# Patient Record
Sex: FEMALE | Race: WHITE | NOT HISPANIC OR LATINO | Employment: UNEMPLOYED | URBAN - METROPOLITAN AREA
[De-identification: names, ages, dates, MRNs, and addresses within clinical notes are randomized per-mention and may not be internally consistent; named-entity substitution may affect disease eponyms.]

---

## 2018-05-02 ENCOUNTER — OFFICE VISIT (OUTPATIENT)
Dept: FAMILY MEDICINE | Facility: CLINIC | Age: 23
End: 2018-05-02
Payer: COMMERCIAL

## 2018-05-02 VITALS
WEIGHT: 168.7 LBS | BODY MASS INDEX: 29.89 KG/M2 | HEIGHT: 63 IN | TEMPERATURE: 98.5 F | DIASTOLIC BLOOD PRESSURE: 75 MMHG | SYSTOLIC BLOOD PRESSURE: 116 MMHG | HEART RATE: 84 BPM

## 2018-05-02 DIAGNOSIS — Z00.00 ROUTINE GENERAL MEDICAL EXAMINATION AT A HEALTH CARE FACILITY: Primary | ICD-10-CM

## 2018-05-02 LAB
CHOLEST SERPL-MCNC: 171 MG/DL
HDLC SERPL-MCNC: 66 MG/DL
LDLC SERPL CALC-MCNC: 94 MG/DL
NONHDLC SERPL-MCNC: 105 MG/DL
TRIGL SERPL-MCNC: 56 MG/DL

## 2018-05-02 PROCEDURE — 80061 LIPID PANEL: CPT | Performed by: FAMILY MEDICINE

## 2018-05-02 PROCEDURE — 36415 COLL VENOUS BLD VENIPUNCTURE: CPT | Performed by: FAMILY MEDICINE

## 2018-05-02 PROCEDURE — 99385 PREV VISIT NEW AGE 18-39: CPT | Performed by: FAMILY MEDICINE

## 2018-05-02 NOTE — MR AVS SNAPSHOT
After Visit Summary   5/2/2018    María Elena Moscoso    MRN: [id number]           Patient Information     Date Of Birth          1995        Visit Information        Provider Department      5/2/2018 8:00 AM Radha Jefferson MD [facility]        Today's Diagnoses     Routine general medical examination at a health care facility    -  1      Care Instructions      Preventive Health Recommendations  Female Ages 18 to 25     Yearly exam:     See your health care provider every year in order to  o Review health changes.   o Discuss preventive care.    o Review your medicines if your doctor has prescribed any.      You should be tested each year for STDs (sexually transmitted diseases).       After age 20, talk to your provider about how often you should have cholesterol testing.      Starting at age 21, get a Pap test every three years. If you have an abnormal result, your doctor may have you test more often.      If you are at risk for diabetes, you should have a diabetes test (fasting glucose).     Shots:     Get a flu shot each year.     Get a tetanus shot every 10 years.     Consider getting the shot (vaccine) that prevents cervical cancer (Gardasil).    Nutrition:     Eat at least 5 servings of fruits and vegetables each day.    Eat whole-grain bread, whole-wheat pasta and brown rice instead of white grains and rice.    Talk to your provider about Calcium and Vitamin D.     Lifestyle    Exercise at least 150 minutes a week each week (30 minutes a day, 5 days a week). This will help you control your weight and prevent disease.    Limit alcohol to one drink per day.    No smoking.     Wear sunscreen to prevent skin cancer.    See your dentist every six months for an exam and cleaning.        Next year we need to make sure that you get a consultation to get the right breast cancer screening for you           Follow-ups after your visit        Who to contact     Normal or non-critical lab  "and imaging results will be communicated to you by MyChart, letter or phone within 4 business days after the clinic has received the results. If you do not hear from us within 7 days, please contact the clinic through Listiahart or phone. If you have a critical or abnormal lab result, we will notify you by phone as soon as possible.  Submit refill requests through iFlipd or call your pharmacy and they will forward the refill request to us. Please allow 3 business days for your refill to be completed.          If you need to speak with a  for additional information , please call: [phone]             Additional Information About Your Visit        iFlipd Information     iFlipd lets you send messages to your doctor, view your test results, renew your prescriptions, schedule appointments and more. To sign up, go to [email or web address] . Click on \"Log in\" on the left side of the screen, which will take you to the Welcome page. Then click on \"Sign up Now\" on the right side of the page.     You will be asked to enter the access code listed below, as well as some personal information. Please follow the directions to create your username and password.     Your access code is: [email or web address]  Expires: 2018  8:51 AM     Your access code will  in 90 days. If you need help or a new code, please call your [facility] clinic or [phone].        Care EveryWhere ID     This is your Care EveryWhere ID. This could be used by other organizations to access your [facility] medical records  [id number]        Your Vitals Were     Pulse Temperature Height BMI (Body Mass Index)          84 98.5  F (36.9  C) (Tympanic) 5' 2.5\" (1.588 m) 30.36 kg/m2         Blood Pressure from Last 3 Encounters:   18 116/75    Weight from Last 3 Encounters:   18 168 lb 11.2 oz (76.5 kg)              We Performed the Following     Lipid panel reflex to direct LDL Fasting        Primary Care Provider Fax #    " Physician No Ref-Primary [phone]       No address on file        Equal Access to Services     FABRICIOBROOKLYN EDMUND : Hadii aad ku hadvaleriaherlinda Munoz, wajuan joseda cyn, marnieporter jamesonkadenfaheem parrbeatricefaheem, keely benjaminteojavy conn. So [facility] [phone].    ATENCIÓN: Si habla español, tiene a rodriguez disposición servicios gratuitos de asistencia lingüística. Llame al [phone].    We comply with applicable federal civil rights laws and [place] laws. We do not discriminate on the basis of race, color, national origin, age, disability, sex, sexual orientation, or gender identity.            Thank you!     Thank you for choosing [facility]  for your care. Our goal is always to provide you with excellent care. Hearing back from our patients is one way we can continue to improve our services. Please take a few minutes to complete the written survey that you may receive in the mail after your visit with us. Thank you!             Your Updated Medication List - Protect others around you: Learn how to safely use, store and throw away your medicines at www.disposemymeds.org.          This list is accurate as of 5/2/18  8:51 AM.  Always use your most recent med list.                   Brand Name Dispense Instructions for use Diagnosis    norgestrel-ethinyl estradiol 0.3-30 MG-MCG per tablet    LO/OVRAL     Take 1 tablet by mouth        ranitidine 150 MG tablet    ZANTAC     Take 150 mg by mouth

## 2018-05-02 NOTE — PATIENT INSTRUCTIONS
Preventive Health Recommendations  Female Ages 18 to 25     Yearly exam:     See your health care provider every year in order to  o Review health changes.   o Discuss preventive care.    o Review your medicines if your doctor has prescribed any.      You should be tested each year for STDs (sexually transmitted diseases).       After age 20, talk to your provider about how often you should have cholesterol testing.      Starting at age 21, get a Pap test every three years. If you have an abnormal result, your doctor may have you test more often.      If you are at risk for diabetes, you should have a diabetes test (fasting glucose).     Shots:     Get a flu shot each year.     Get a tetanus shot every 10 years.     Consider getting the shot (vaccine) that prevents cervical cancer (Gardasil).    Nutrition:     Eat at least 5 servings of fruits and vegetables each day.    Eat whole-grain bread, whole-wheat pasta and brown rice instead of white grains and rice.    Talk to your provider about Calcium and Vitamin D.     Lifestyle    Exercise at least 150 minutes a week each week (30 minutes a day, 5 days a week). This will help you control your weight and prevent disease.    Limit alcohol to one drink per day.    No smoking.     Wear sunscreen to prevent skin cancer.    See your dentist every six months for an exam and cleaning.        Next year we need to make sure that you get a consultation to get the right breast cancer screening for you

## 2018-05-02 NOTE — PROGRESS NOTES
SUBJECTIVE:   CC: María Elena Moscoso is an 22 year old woman who presents for preventive health visit.     Healthy Habits:    Do you get at least three servings of calcium containing foods daily (dairy, green leafy vegetables, etc.)? yes    Amount of exercise or daily activities, outside of work: 4-5 day(s) per week    Problems taking medications regularly No    Medication side effects: No    Have you had an eye exam in the past two years? yes    Do you see a dentist twice per year? yes    Do you have sleep apnea, excessive snoring or daytime drowsiness?no      She is pretty healthy she is getting  in 17 days   Mom with breast cancer age 35   She is really healthy             Today's PHQ-2 Score: No flowsheet data found.    Abuse: Current or Past(Physical, Sexual or Emotional)- No  Do you feel safe in your environment - Yes    Social History   Substance Use Topics     Smoking status: Never Smoker     Smokeless tobacco: Never Used     Alcohol use Yes      Comment: Social     If you drink alcohol do you typically have >3 drinks per day or >7 drinks per week? No                     Reviewed orders with patient.  Reviewed health maintenance and updated orders accordingly - Yes  There is no problem list on file for this patient.    Past Surgical History:   Procedure Laterality Date     NO HISTORY OF SURGERY         Social History   Substance Use Topics     Smoking status: Never Smoker     Smokeless tobacco: Never Used     Alcohol use Yes      Comment: Social     Family History   Problem Relation Age of Onset     Breast Cancer Mother      Dementia Maternal Grandfather      Other Cancer Paternal Grandfather            Will start getting mammo next year willl need individualized plan     Pertinent mammograms are reviewed under the imaging tab.    *Patient reported last pap August 2017 at [facility].  History of abnormal Pap smear: NO - age 21-29 PAP every 3 years recommended    Reviewed and updated as  "needed this visit by clinical staff  Tobacco  Allergies  Meds  Med Hx  Surg Hx  Fam Hx  Soc Hx        Reviewed and updated as needed this visit by Provider            ROS:  CONSTITUTIONAL: NEGATIVE for fever, chills, change in weight  INTEGUMENTARU/SKIN: NEGATIVE for worrisome rashes, moles or lesions   EYES: NEGATIVE for vision changes or irritation  ENT: NEGATIVE for ear, mouth and throat problems  RESP: NEGATIVE for significant cough or SOB  BREAST: NEGATIVE for masses, tenderness or discharge  CV: NEGATIVE for chest pain, palpitations or peripheral edema  GI: NEGATIVE for nausea, abdominal pain, heartburn, or change in bowel habits  : NEGATIVE for unusual urinary or vaginal symptoms. Periods are regular.  MUSCULOSKELETAL: NEGATIVE for significant arthralgias or myalgia  NEURO: NEGATIVE for weakness, dizziness or paresthesias  PSYCHIATRIC: NEGATIVE for changes in mood or affect    OBJECTIVE:   /75  Pulse 84  Temp 98.5  F (36.9  C) (Tympanic)  Ht 5' 2.5\" (1.588 m)  Wt 168 lb 11.2 oz (76.5 kg)  BMI 30.36 kg/m2  EXAM:  GENERAL: healthy, alert and no distress  HENT: ear canals and TM's normal, nose and mouth without ulcers or lesions  NECK: no adenopathy, no asymmetry, masses, or scars and thyroid normal to palpation  RESP: lungs clear to auscultation - no rales, rhonchi or wheezes  BREAST: normal without masses, tenderness or nipple discharge and no palpable axillary masses or adenopathy  CV: regular rate and rhythm, normal S1 S2, no S3 or S4, no murmur, click or rub, no peripheral edema and peripheral pulses strong  ABDOMEN: soft, nontender, no hepatosplenomegaly, no masses and bowel sounds normal  MS: no gross musculoskeletal defects noted, no edema  PSYCH: mentation appears normal, affect normal/bright    ASSESSMENT/PLAN:   1. Routine general medical examination at a health care facility    - Lipid panel reflex to direct LDL Fasting    COUNSELING:   Reviewed preventive health counseling, as " "reflected in patient instructions       will need to be referred for individualized breast cancer screening next year,.         reports that she has never smoked. She has never used smokeless tobacco.    Estimated body mass index is 30.36 kg/(m^2) as calculated from the following:    Height as of this encounter: 5' 2.5\" (1.588 m).    Weight as of this encounter: 168 lb 11.2 oz (76.5 kg).   Weight management plan: Discussed healthy diet and exercise guidelines and patient will follow up in 12 months in clinic to re-evaluate.    Counseling Resources:  ATP IV Guidelines  Pooled Cohorts Equation Calculator  Breast Cancer Risk Calculator  FRAX Risk Assessment  ICSI Preventive Guidelines  Dietary Guidelines for Americans, 2010  USDA's MyPlate  ASA Prophylaxis  Lung CA Screening    Radha Jefferson MD  [facility]  "

## 2019-03-25 ENCOUNTER — HOSPITAL ENCOUNTER (OUTPATIENT)
Dept: OBGYN | Facility: HOSPITAL | Age: 24
Discharge: HOME | End: 2019-03-25
Attending: OBSTETRICS & GYNECOLOGY | Admitting: OBSTETRICS & GYNECOLOGY

## 2019-03-25 LAB
ABO/RH(D): NORMAL
ALT SERPL W P-5'-P-CCNC: 9 U/L (ref 0–45)
ANTIBODY SCREEN: NEGATIVE
APTT PPP: 29 SECONDS (ref 24–37)
AST SERPL W P-5'-P-CCNC: 16 U/L (ref 0–40)
CREAT SERPL-MCNC: 0.66 MG/DL (ref 0.6–1.1)
CREAT UR-MCNC: 29.9 MG/DL
ERYTHROCYTE [DISTWIDTH] IN BLOOD BY AUTOMATED COUNT: 14.5 % (ref 11–14.5)
GFR SERPL CREATININE-BSD FRML MDRD: >60 ML/MIN/1.73M2
HCT VFR BLD AUTO: 36.1 % (ref 35–47)
HGB BLD-MCNC: 12.2 G/DL (ref 12–16)
INR PPP: 0.98 (ref 0.9–1.1)
MCH RBC QN AUTO: 28.6 PG (ref 27–34)
MCHC RBC AUTO-ENTMCNC: 33.8 G/DL (ref 32–36)
MCV RBC AUTO: 85 FL (ref 80–100)
PLATELET # BLD AUTO: 214 THOU/UL (ref 140–440)
PMV BLD AUTO: 11.3 FL (ref 8.5–12.5)
PROTEIN, RANDOM URINE - HISTORICAL: <7 MG/DL
PROTEIN/CREAT RATIO, RANDOM UR: NORMAL
RBC # BLD AUTO: 4.27 MILL/UL (ref 3.8–5.4)
URATE SERPL-MCNC: 5.6 MG/DL (ref 2–7.5)
WBC: 12.1 THOU/UL (ref 4–11)

## 2019-03-25 ASSESSMENT — MIFFLIN-ST. JEOR: SCORE: 1537.61

## 2019-04-07 ASSESSMENT — MIFFLIN-ST. JEOR: SCORE: 1537.61

## 2019-04-08 ENCOUNTER — SURGERY - HEALTHEAST (OUTPATIENT)
Dept: OBGYN | Facility: HOSPITAL | Age: 24
End: 2019-04-08

## 2019-04-08 ENCOUNTER — ANESTHESIA - HEALTHEAST (OUTPATIENT)
Dept: OBGYN | Facility: HOSPITAL | Age: 24
End: 2019-04-08

## 2019-04-12 ENCOUNTER — COMMUNICATION - HEALTHEAST (OUTPATIENT)
Dept: OBGYN | Facility: HOSPITAL | Age: 24
End: 2019-04-12

## 2020-04-14 ENCOUNTER — SURGERY - HEALTHEAST (OUTPATIENT)
Dept: OBGYN | Facility: HOSPITAL | Age: 25
End: 2020-04-14

## 2020-04-14 ENCOUNTER — ANESTHESIA - HEALTHEAST (OUTPATIENT)
Dept: OBGYN | Facility: HOSPITAL | Age: 25
End: 2020-04-14

## 2020-04-14 ASSESSMENT — MIFFLIN-ST. JEOR
SCORE: 1534.21
SCORE: 1534.21

## 2021-05-27 NOTE — ANESTHESIA PROCEDURE NOTES
Epidural Block    Patient location during procedure: OB  Time Called: 4/8/2019 4:41 AM  Reason for Block:labor epidural  Staffing:  Performing  Anesthesiologist: Scott Preston MD  Preanesthetic Checklist  Completed: patient identified, risks, benefits, and alternatives discussed, timeout performed, consent obtained, at patient's request, airway assessed, oxygen available, suction available, emergency drugs available and hand hygiene performed  Procedure  Patient position: sitting  Prep: ChloraPrep  Patient monitoring: continuous pulse oximetry, heart rate and blood pressure  Approach: midline  Location: L2-L3  Injection technique: OG saline  Number of Attempts:1  Needle  Needle type: Devunityjackie   Needle gauge: 18 G     Catheter in Space: 5  Assessment  Sensory level: T8  No complications

## 2021-05-27 NOTE — ANESTHESIA PREPROCEDURE EVALUATION
Anesthesia Evaluation      Patient summary reviewed   No history of anesthetic complications     Airway   Mallampati: II  Neck ROM: full   Pulmonary - negative ROS and normal exam                          Cardiovascular - negative ROS and normal exam  Exercise tolerance: > or = 4 METS   Neuro/Psych - negative ROS     Endo/Other    (+) obesity, pregnant     GI/Hepatic/Renal - negative ROS           Dental - normal exam                        Anesthesia Plan  Planned anesthetic: epidural  Plan for epidural conversion to surgical block with intrathecal narcotic (morphine 2 mg) and may consider postoperative bilateral TAP blocks for analgesia.   Pt high risk for aspiration and will receive preop sodium bicitrate.  Discussed potential need to convert GA.  Discussed potential need for blood transfusion.    ASA 2     Anesthetic plan and risks discussed with: patient and spouse    Post-op plan: epidural analgesia and routine recovery

## 2021-05-27 NOTE — PROGRESS NOTES
"S:  Pt here for r/o PIH.  Pt denies denies headache, visual disturbances, and epigastric pain.  Pt reports active fetal movement and occasional mild ctx.    O:    Vitals:    03/25/19 1346 03/25/19 1408 03/25/19 1523   BP: 125/67 128/72 118/64   Patient Position: Semi-gonzalez  Semi-gonzalez   Cuff Size: Adult Regular  Adult Regular   Pulse: 83 83 74   Resp: 18 18 18   Temp: 98.3  F (36.8  C)     TempSrc: Oral     Weight: 189 lb (85.7 kg)     Height: 5' 1.5\" (1.562 m)       HELLP panel WNL, BPP 8/8 with an CHRISTOPHER of 15.9, FHTs category 1, occasional ctx, DTRs bilaterally 2+, and no clonus.    A:  IUP 37 weeks    P:  Consulted with farhana Lai to discharge pt to home with instructions of s/sx of preeclampsia.  Pt to follow-up in clinic in 1 week.    FAN Underwood,CNM    "

## 2021-05-27 NOTE — PROGRESS NOTES
Pt arrived to [facility] for r/o PIH. Vitals taken and orders for PIH labs sent. Pt denies HA, visual disturbances, or epigastric pain. Pt brought down for BPP. Will continue to monitor.

## 2021-05-27 NOTE — TELEPHONE ENCOUNTER
OB Follow Up Phone Call    Patient: María Elena Camacho  : 1995  MRN: [id number]     Location  [facility]  Provider Sylvie Leon MD      :   N/A    Language:   English    Discharge Follow-Up:  Follow-Up call by Outreach nurse: Message left for patient    Type of Delivery:      Feeding Method:  Breastfeeding    Comments:   Left message with [facility] Outreach phone number for patient to call back if desired. Reminded patient to schedule postpartum follow-up appointment as directed by PCP at discharge.  Encouraged patient to call clinic/PCP with questions or concerns.    Completed by:   Ansley Diamond RN      Patient: María Elena Camacho  : 1995  MRN: [id number]

## 2021-05-27 NOTE — ANESTHESIA POSTPROCEDURE EVALUATION
Patient: María Elena BANDA Lapadat   SECTION  Anesthesia type: epidural    Patient location: Labor and Delivery  Last vitals:   Vitals Value Taken Time   /82 2019  7:00 PM   Temp 36.4  C (97.5  F) 2019  7:00 PM   Pulse 70 2019  7:00 PM   Resp 16 2019  7:00 PM   SpO2 97 % 2019  7:00 PM     Post vital signs: stable  Level of consciousness: awake, alert and oriented  Post-anesthesia pain: pain controlled  Post-anesthesia nausea and vomiting: no  Pulmonary: unassisted, return to baseline  Cardiovascular: stable and blood pressure at baseline  Hydration: adequate  Anesthetic events: no    QCDR Measures:  ASA# 11 - Azra-op Cardiac Arrest: ASA11B - Patient did NOT experience unanticipated cardiac arrest  ASA# 12 - Azra-op Mortality Rate: ASA12B - Patient did NOT die  ASA# 13 - PACU Re-Intubation Rate: NA - No ETT / LMA used for case  ASA# 10 - Composite Anes Safety: ASA10A - No serious adverse event    Additional Notes:  Epidural worked well for labor and her .  There were no problems with anesthesia and no f/u is necessary.

## 2021-05-27 NOTE — ANESTHESIA CARE TRANSFER NOTE
Last vitals:   Vitals:    04/08/19 1406   BP: (!) 96/93   Pulse: 81   Resp: 16   Temp: 36.9  C (98.5  F)   SpO2: 98%   Pt transported to OB room 15, monitors on, VSS, report to RN.   Patient's level of consciousness is drowsy  Spontaneous respirations: yes  Maintains airway independently: yes  Dentition unchanged: yes  Oropharynx: oropharynx clear of all foreign objects    QCDR Measures:  ASA# 20 - Surgical Safety Checklist: WHO surgical safety checklist completed prior to induction    PQRS# 430 - Adult PONV Prevention: 4558F - Pt received => 2 anti-emetic agents (different classes) preop & intraop  ASA# 8 - Peds PONV Prevention: NA - Not pediatric patient, not GA or 2 or more risk factors NOT present  PQRS# 424 - Azra-op Temp Management: 4559F - At least one body temp DOCUMENTED => 35.5C or 95.9F within required timeframe  PQRS# 426 - PACU Transfer Protocol: - Transfer of care checklist used  ASA# 14 - Acute Post-op Pain: ASA14B - Patient did NOT experience pain >= 7 out of 10

## 2021-05-30 ENCOUNTER — RECORDS - HEALTHEAST (OUTPATIENT)
Dept: ADMINISTRATIVE | Facility: CLINIC | Age: 26
End: 2021-05-30

## 2021-06-02 VITALS — HEIGHT: 62 IN | BODY MASS INDEX: 34.78 KG/M2 | WEIGHT: 189 LBS

## 2021-06-02 VITALS — BODY MASS INDEX: 34.78 KG/M2 | WEIGHT: 189 LBS | HEIGHT: 62 IN

## 2021-06-04 VITALS — WEIGHT: 190 LBS | BODY MASS INDEX: 35.87 KG/M2 | HEIGHT: 61 IN

## 2021-06-07 NOTE — ANESTHESIA POSTPROCEDURE EVALUATION
Patient: María Elena BANDA Lapadat  Procedure(s):  REPEAT  SECTION  Anesthesia type: spinal    Patient location: Labor and Delivery  Last vitals:   Vitals Value Taken Time   /63 4/15/2020  7:55 AM   Temp 36.6  C (97.9  F) 4/15/2020  7:55 AM   Pulse 74 4/15/2020  7:55 AM   Resp 18 4/15/2020  7:55 AM   SpO2 98 % 4/15/2020  7:55 AM     Post vital signs: stable  Level of consciousness: awake and responds to simple questions  Post-anesthesia pain: pain controlled  Post-anesthesia nausea and vomiting: no  Pulmonary: unassisted, return to baseline  Cardiovascular: stable and blood pressure at baseline  Hydration: adequate  Anesthetic events: no    QCDR Measures:  ASA# 11 - Azra-op Cardiac Arrest: ASA11B - Patient did NOT experience unanticipated cardiac arrest  ASA# 12 - Azra-op Mortality Rate: ASA12B - Patient did NOT die  ASA# 13 - PACU Re-Intubation Rate: NA - No ETT / LMA used for case  ASA# 10 - Composite Anes Safety: ASA10A - No serious adverse event    Additional Notes:    Patient is comfortable s/p  with neuraxial anesthetic and TAP blocks. Patient is satisfied with her anesthetic and recovering well. Patient's sensory and motor function in LE are returning to baseline, emptying bladder as expected, minimal to no tenderness at neuraxial insertion site. Advised patient to alert her nurses, physicians, department of anesthesia if anything changes.

## 2021-06-07 NOTE — ANESTHESIA PROCEDURE NOTES
Peripheral Block    Patient location during procedure: OR  Start time: 4/14/2020 8:58 AM  End time: 4/14/2020 9:05 AM  post-op analgesia per surgeon order as noted in medical record  Staffing:  Performing  Anesthesiologist: Jimmy Huggins MD  Preanesthetic Checklist  Completed: patient identified, site marked, risks, benefits, and alternatives discussed, timeout performed, consent obtained, airway assessed, oxygen available, suction available, emergency drugs available and hand hygiene performed  Peripheral Block  Block type: other, TAP  Prep: ChloraPrep  Patient position: supine  Patient monitoring: cardiac monitor, continuous pulse oximetry, heart rate and blood pressure  Laterality: bilateral, same technique used bilaterally  Injection technique: ultrasound guided    Ultrasound used to visualize needle placement in proximity to nerve being blocked: yes   US used to visualize anesthetic spread  Visualized anatomic structures normal  No Pathological Findings  Permanent ultrasound image captured for medical record  Sterile gel and probe cover used for ultrasound.  Needle  Needle type: Stimuplex   Needle gauge: 20G  Needle length: 6 in  no peripheral nerve catheter placed  Assessment  Injection assessment: no difficulty with injection, negative aspiration for heme, no paresthesia on injection and incremental injection  Additional Notes  On right side, after 5cc of 0.25% Bupivicaine injected, a scant amount of serosanguinous return in catheter which was gently repositioned. No return after repositioning of needle.

## 2021-06-07 NOTE — ANESTHESIA CARE TRANSFER NOTE
Last vitals:   Vitals:    04/14/20 0915   BP: 118/69   Pulse: 74   Resp: 16   Temp: 36.4  C (97.5  F)   SpO2: 99%     Patient's level of consciousness is awake  Spontaneous respirations: yes  Maintains airway independently: yes  Dentition unchanged: yes  Oropharynx: oropharynx clear of all foreign objects    QCDR Measures:  ASA# 20 - Surgical Safety Checklist: WHO surgical safety checklist completed prior to induction    PQRS# 430 - Adult PONV Prevention: 4558F - Pt received => 2 anti-emetic agents (different classes) preop & intraop  ASA# 8 - Peds PONV Prevention: NA - Not pediatric patient, not GA or 2 or more risk factors NOT present  PQRS# 424 - Azra-op Temp Management: 4559F - At least one body temp DOCUMENTED => 35.5C or 95.9F within required timeframe  PQRS# 426 - PACU Transfer Protocol: - Transfer of care checklist used  ASA# 14 - Acute Post-op Pain: ASA14B - Patient did NOT experience pain >= 7 out of 10

## 2021-06-07 NOTE — ANESTHESIA PREPROCEDURE EVALUATION
Anesthesia Evaluation      Patient summary reviewed   No history of anesthetic complications     Airway   Mallampati: II   Pulmonary - negative ROS and normal exam                          Cardiovascular - negative ROS and normal exam  Rhythm: regular  Rate: normal,         Neuro/Psych - negative ROS     Endo/Other    (+) obesity, pregnant     GI/Hepatic/Renal - negative ROS           Dental - normal exam                        Anesthesia Plan  Planned anesthetic: spinal  SAB  TAP  ASA 2     Anesthetic plan and risks discussed with: patient    Post-op plan: extended intubation/vent support

## 2021-06-07 NOTE — ANESTHESIA PROCEDURE NOTES
Spinal Block    Patient location during procedure: OR  Start time: 4/14/2020 7:35 AM  End time: 4/14/2020 7:40 AM  Reason for block: primary anesthetic    Staffing:  Performing  Anesthesiologist: Jimmy Huggins MD    Preanesthetic Checklist  Completed: patient identified, risks, benefits, and alternatives discussed, timeout performed, consent obtained, airway assessed, oxygen available, suction available, emergency drugs available and hand hygiene performed  Spinal Block  Patient position: sitting  Prep: ChloraPrep  Patient monitoring: heart rate, cardiac monitor, continuous pulse ox and blood pressure  Approach: midline  Location: L3-4  Injection technique: single-shot  Needle type: pencil-tip   Needle gauge: 24 G    Assessment  Sensory level: T8

## 2021-06-16 PROBLEM — Z98.891 S/P REPEAT LOW TRANSVERSE C-SECTION: Status: ACTIVE | Noted: 2020-04-14

## 2021-06-16 PROBLEM — Z34.90 PREGNANT: Status: ACTIVE | Noted: 2019-04-07

## 2021-08-01 ENCOUNTER — HEALTH MAINTENANCE LETTER (OUTPATIENT)
Age: 26
End: 2021-08-01

## 2021-09-26 ENCOUNTER — HEALTH MAINTENANCE LETTER (OUTPATIENT)
Age: 26
End: 2021-09-26

## 2022-01-28 LAB
ABO (EXTERNAL): NORMAL
HEMOGLOBIN (EXTERNAL): 12.7 G/DL (ref 11.7–15.5)
HEPATITIS B SURFACE ANTIGEN (EXTERNAL): NONREACTIVE
HIV1+2 AB SERPL QL IA: NONREACTIVE
PLATELET COUNT (EXTERNAL): 293 10E3/UL (ref 140–400)
RH (EXTERNAL): POSITIVE
RUBELLA ANTIBODY IGG (EXTERNAL): NORMAL

## 2022-08-09 LAB — GROUP B STREPTOCOCCUS (EXTERNAL): POSITIVE

## 2022-08-21 ENCOUNTER — ANESTHESIA (OUTPATIENT)
Dept: OBGYN | Facility: HOSPITAL | Age: 27
End: 2022-08-21
Payer: COMMERCIAL

## 2022-08-21 ENCOUNTER — ANESTHESIA EVENT (OUTPATIENT)
Dept: OBGYN | Facility: HOSPITAL | Age: 27
End: 2022-08-21
Payer: COMMERCIAL

## 2022-08-21 ENCOUNTER — HOSPITAL ENCOUNTER (INPATIENT)
Facility: HOSPITAL | Age: 27
LOS: 2 days | Discharge: HOME | End: 2022-08-23
Attending: OBSTETRICS & GYNECOLOGY | Admitting: OBSTETRICS & GYNECOLOGY
Payer: COMMERCIAL

## 2022-08-21 DIAGNOSIS — Z98.891 S/P REPEAT LOW TRANSVERSE C-SECTION: ICD-10-CM

## 2022-08-21 PROBLEM — Z36.89 ENCOUNTER FOR TRIAGE IN PREGNANT PATIENT: Status: ACTIVE | Noted: 2022-08-21

## 2022-08-21 LAB
ABO/RH(D): NORMAL
ALBUMIN MFR UR ELPH: <7 MG/DL
ALT SERPL W P-5'-P-CCNC: 12 U/L (ref 0–45)
ANTIBODY SCREEN: NEGATIVE
APTT PPP: 26 SECONDS (ref 22–38)
AST SERPL W P-5'-P-CCNC: 16 U/L (ref 0–40)
CREAT UR-MCNC: 11 MG/DL
ERYTHROCYTE [DISTWIDTH] IN BLOOD BY AUTOMATED COUNT: 14.9 % (ref 10–15)
HCT VFR BLD AUTO: 40.6 % (ref 35–47)
HGB BLD-MCNC: 13.8 G/DL (ref 11.7–15.7)
HOLD SPECIMEN: NORMAL
INR PPP: 0.98 (ref 0.85–1.15)
MCH RBC QN AUTO: 28.8 PG (ref 26.5–33)
MCHC RBC AUTO-ENTMCNC: 34 G/DL (ref 31.5–36.5)
MCV RBC AUTO: 85 FL (ref 78–100)
PLATELET # BLD AUTO: 215 10E3/UL (ref 150–450)
PROT/CREAT 24H UR: NORMAL MG/G{CREAT}
RBC # BLD AUTO: 4.79 10E6/UL (ref 3.8–5.2)
SARS-COV-2 RNA RESP QL NAA+PROBE: NEGATIVE
SPECIMEN EXPIRATION DATE: NORMAL
URATE SERPL-MCNC: 4.5 MG/DL (ref 2–7.5)
WBC # BLD AUTO: 16.4 10E3/UL (ref 4–11)

## 2022-08-21 PROCEDURE — 86850 RBC ANTIBODY SCREEN: CPT | Performed by: OBSTETRICS & GYNECOLOGY

## 2022-08-21 PROCEDURE — 85730 THROMBOPLASTIN TIME PARTIAL: CPT | Performed by: OBSTETRICS & GYNECOLOGY

## 2022-08-21 PROCEDURE — 84450 TRANSFERASE (AST) (SGOT): CPT | Performed by: OBSTETRICS & GYNECOLOGY

## 2022-08-21 PROCEDURE — 84156 ASSAY OF PROTEIN URINE: CPT | Performed by: OBSTETRICS & GYNECOLOGY

## 2022-08-21 PROCEDURE — 250N000011 HC RX IP 250 OP 636: Performed by: OBSTETRICS & GYNECOLOGY

## 2022-08-21 PROCEDURE — 85610 PROTHROMBIN TIME: CPT | Performed by: OBSTETRICS & GYNECOLOGY

## 2022-08-21 PROCEDURE — 36415 COLL VENOUS BLD VENIPUNCTURE: CPT | Performed by: OBSTETRICS & GYNECOLOGY

## 2022-08-21 PROCEDURE — 250N000009 HC RX 250: Performed by: NURSE ANESTHETIST, CERTIFIED REGISTERED

## 2022-08-21 PROCEDURE — 250N000011 HC RX IP 250 OP 636: Performed by: ANESTHESIOLOGY

## 2022-08-21 PROCEDURE — 258N000003 HC RX IP 258 OP 636: Performed by: OBSTETRICS & GYNECOLOGY

## 2022-08-21 PROCEDURE — U0003 INFECTIOUS AGENT DETECTION BY NUCLEIC ACID (DNA OR RNA); SEVERE ACUTE RESPIRATORY SYNDROME CORONAVIRUS 2 (SARS-COV-2) (CORONAVIRUS DISEASE [COVID-19]), AMPLIFIED PROBE TECHNIQUE, MAKING USE OF HIGH THROUGHPUT TECHNOLOGIES AS DESCRIBED BY CMS-2020-01-R: HCPCS | Performed by: OBSTETRICS & GYNECOLOGY

## 2022-08-21 PROCEDURE — 272N000001 HC OR GENERAL SUPPLY STERILE: Performed by: OBSTETRICS & GYNECOLOGY

## 2022-08-21 PROCEDURE — 250N000009 HC RX 250: Performed by: OBSTETRICS & GYNECOLOGY

## 2022-08-21 PROCEDURE — 86780 TREPONEMA PALLIDUM: CPT | Performed by: OBSTETRICS & GYNECOLOGY

## 2022-08-21 PROCEDURE — 84550 ASSAY OF BLOOD/URIC ACID: CPT | Performed by: OBSTETRICS & GYNECOLOGY

## 2022-08-21 PROCEDURE — 84460 ALANINE AMINO (ALT) (SGPT): CPT | Performed by: OBSTETRICS & GYNECOLOGY

## 2022-08-21 PROCEDURE — 370N000017 HC ANESTHESIA TECHNICAL FEE, PER MIN: Performed by: OBSTETRICS & GYNECOLOGY

## 2022-08-21 PROCEDURE — 250N000013 HC RX MED GY IP 250 OP 250 PS 637: Performed by: OBSTETRICS & GYNECOLOGY

## 2022-08-21 PROCEDURE — 250N000011 HC RX IP 250 OP 636: Performed by: NURSE ANESTHETIST, CERTIFIED REGISTERED

## 2022-08-21 PROCEDURE — 36591 DRAW BLOOD OFF VENOUS DEVICE: CPT | Performed by: OBSTETRICS & GYNECOLOGY

## 2022-08-21 PROCEDURE — 258N000003 HC RX IP 258 OP 636: Performed by: NURSE ANESTHETIST, CERTIFIED REGISTERED

## 2022-08-21 PROCEDURE — 120N000001 HC R&B MED SURG/OB

## 2022-08-21 PROCEDURE — 999N000249 HC STATISTIC C-SECTION ON UNIT

## 2022-08-21 PROCEDURE — 85027 COMPLETE CBC AUTOMATED: CPT | Performed by: OBSTETRICS & GYNECOLOGY

## 2022-08-21 PROCEDURE — 360N000076 HC SURGERY LEVEL 3, PER MIN: Performed by: OBSTETRICS & GYNECOLOGY

## 2022-08-21 RX ORDER — ONDANSETRON 2 MG/ML
4 INJECTION INTRAMUSCULAR; INTRAVENOUS EVERY 6 HOURS PRN
Status: DISCONTINUED | OUTPATIENT
Start: 2022-08-21 | End: 2022-08-23 | Stop reason: HOSPADM

## 2022-08-21 RX ORDER — CEFAZOLIN SODIUM/WATER 2 G/20 ML
2 SYRINGE (ML) INTRAVENOUS SEE ADMIN INSTRUCTIONS
Status: DISCONTINUED | OUTPATIENT
Start: 2022-08-21 | End: 2022-08-21 | Stop reason: HOSPADM

## 2022-08-21 RX ORDER — CITRIC ACID/SODIUM CITRATE 334-500MG
15 SOLUTION, ORAL ORAL
Status: DISCONTINUED | OUTPATIENT
Start: 2022-08-21 | End: 2022-08-21

## 2022-08-21 RX ORDER — ACETAMINOPHEN 325 MG/1
975 TABLET ORAL EVERY 6 HOURS
Status: DISCONTINUED | OUTPATIENT
Start: 2022-08-21 | End: 2022-08-23 | Stop reason: HOSPADM

## 2022-08-21 RX ORDER — OXYTOCIN 10 [USP'U]/ML
10 INJECTION, SOLUTION INTRAMUSCULAR; INTRAVENOUS
Status: DISCONTINUED | OUTPATIENT
Start: 2022-08-21 | End: 2022-08-21 | Stop reason: HOSPADM

## 2022-08-21 RX ORDER — OXYTOCIN/0.9 % SODIUM CHLORIDE 30/500 ML
340 PLASTIC BAG, INJECTION (ML) INTRAVENOUS CONTINUOUS PRN
Status: DISCONTINUED | OUTPATIENT
Start: 2022-08-21 | End: 2022-08-21 | Stop reason: HOSPADM

## 2022-08-21 RX ORDER — NALBUPHINE HYDROCHLORIDE 10 MG/ML
2.5-5 INJECTION, SOLUTION INTRAMUSCULAR; INTRAVENOUS; SUBCUTANEOUS EVERY 6 HOURS PRN
Status: DISCONTINUED | OUTPATIENT
Start: 2022-08-21 | End: 2022-08-23 | Stop reason: HOSPADM

## 2022-08-21 RX ORDER — METOCLOPRAMIDE HYDROCHLORIDE 5 MG/ML
10 INJECTION INTRAMUSCULAR; INTRAVENOUS EVERY 6 HOURS PRN
Status: DISCONTINUED | OUTPATIENT
Start: 2022-08-21 | End: 2022-08-23 | Stop reason: HOSPADM

## 2022-08-21 RX ORDER — MISOPROSTOL 200 UG/1
400 TABLET ORAL
Status: DISCONTINUED | OUTPATIENT
Start: 2022-08-21 | End: 2022-08-23 | Stop reason: HOSPADM

## 2022-08-21 RX ORDER — ONDANSETRON 4 MG/1
4 TABLET, ORALLY DISINTEGRATING ORAL EVERY 30 MIN PRN
Status: DISCONTINUED | OUTPATIENT
Start: 2022-08-21 | End: 2022-08-23 | Stop reason: HOSPADM

## 2022-08-21 RX ORDER — OXYTOCIN 10 [USP'U]/ML
10 INJECTION, SOLUTION INTRAMUSCULAR; INTRAVENOUS
Status: CANCELLED | OUTPATIENT
Start: 2022-08-21

## 2022-08-21 RX ORDER — OXYCODONE HYDROCHLORIDE 5 MG/1
5 TABLET ORAL EVERY 4 HOURS PRN
Status: DISCONTINUED | OUTPATIENT
Start: 2022-08-21 | End: 2022-08-23 | Stop reason: HOSPADM

## 2022-08-21 RX ORDER — DEXTROSE, SODIUM CHLORIDE, SODIUM LACTATE, POTASSIUM CHLORIDE, AND CALCIUM CHLORIDE 5; .6; .31; .03; .02 G/100ML; G/100ML; G/100ML; G/100ML; G/100ML
INJECTION, SOLUTION INTRAVENOUS CONTINUOUS
Status: DISCONTINUED | OUTPATIENT
Start: 2022-08-21 | End: 2022-08-23 | Stop reason: HOSPADM

## 2022-08-21 RX ORDER — NALOXONE HYDROCHLORIDE 0.4 MG/ML
0.4 INJECTION, SOLUTION INTRAMUSCULAR; INTRAVENOUS; SUBCUTANEOUS
Status: DISCONTINUED | OUTPATIENT
Start: 2022-08-21 | End: 2022-08-23 | Stop reason: HOSPADM

## 2022-08-21 RX ORDER — NALOXONE HYDROCHLORIDE 0.4 MG/ML
0.2 INJECTION, SOLUTION INTRAMUSCULAR; INTRAVENOUS; SUBCUTANEOUS
Status: DISCONTINUED | OUTPATIENT
Start: 2022-08-21 | End: 2022-08-23 | Stop reason: HOSPADM

## 2022-08-21 RX ORDER — MODIFIED LANOLIN
OINTMENT (GRAM) TOPICAL
Status: DISCONTINUED | OUTPATIENT
Start: 2022-08-21 | End: 2022-08-23 | Stop reason: HOSPADM

## 2022-08-21 RX ORDER — IBUPROFEN 800 MG/1
800 TABLET, FILM COATED ORAL EVERY 6 HOURS PRN
Status: DISCONTINUED | OUTPATIENT
Start: 2022-08-21 | End: 2022-08-23 | Stop reason: HOSPADM

## 2022-08-21 RX ORDER — LIDOCAINE 40 MG/G
CREAM TOPICAL
Status: DISCONTINUED | OUTPATIENT
Start: 2022-08-21 | End: 2022-08-21

## 2022-08-21 RX ORDER — PROCHLORPERAZINE MALEATE 10 MG
10 TABLET ORAL EVERY 6 HOURS PRN
Status: DISCONTINUED | OUTPATIENT
Start: 2022-08-21 | End: 2022-08-23 | Stop reason: HOSPADM

## 2022-08-21 RX ORDER — SODIUM CHLORIDE, SODIUM LACTATE, POTASSIUM CHLORIDE, CALCIUM CHLORIDE 600; 310; 30; 20 MG/100ML; MG/100ML; MG/100ML; MG/100ML
INJECTION, SOLUTION INTRAVENOUS CONTINUOUS
Status: DISCONTINUED | OUTPATIENT
Start: 2022-08-22 | End: 2022-08-23 | Stop reason: HOSPADM

## 2022-08-21 RX ORDER — MORPHINE SULFATE 1 MG/ML
INJECTION, SOLUTION EPIDURAL; INTRATHECAL; INTRAVENOUS PRN
Status: DISCONTINUED | OUTPATIENT
Start: 2022-08-21 | End: 2022-08-21

## 2022-08-21 RX ORDER — OXYTOCIN/0.9 % SODIUM CHLORIDE 30/500 ML
100-340 PLASTIC BAG, INJECTION (ML) INTRAVENOUS CONTINUOUS PRN
Status: CANCELLED | OUTPATIENT
Start: 2022-08-21

## 2022-08-21 RX ORDER — OXYTOCIN/0.9 % SODIUM CHLORIDE 30/500 ML
340 PLASTIC BAG, INJECTION (ML) INTRAVENOUS CONTINUOUS PRN
Status: COMPLETED | OUTPATIENT
Start: 2022-08-21 | End: 2022-08-22

## 2022-08-21 RX ORDER — MISOPROSTOL 200 UG/1
400 TABLET ORAL
Status: DISCONTINUED | OUTPATIENT
Start: 2022-08-21 | End: 2022-08-21 | Stop reason: HOSPADM

## 2022-08-21 RX ORDER — LIDOCAINE 40 MG/G
CREAM TOPICAL
Status: DISCONTINUED | OUTPATIENT
Start: 2022-08-21 | End: 2022-08-23 | Stop reason: HOSPADM

## 2022-08-21 RX ORDER — KETOROLAC TROMETHAMINE 30 MG/ML
30 INJECTION, SOLUTION INTRAMUSCULAR; INTRAVENOUS EVERY 6 HOURS
Status: DISPENSED | OUTPATIENT
Start: 2022-08-21 | End: 2022-08-22

## 2022-08-21 RX ORDER — LIDOCAINE 40 MG/G
CREAM TOPICAL
Status: DISCONTINUED | OUTPATIENT
Start: 2022-08-21 | End: 2022-08-21 | Stop reason: HOSPADM

## 2022-08-21 RX ORDER — CITRIC ACID/SODIUM CITRATE 334-500MG
30 SOLUTION, ORAL ORAL
Status: COMPLETED | OUTPATIENT
Start: 2022-08-21 | End: 2022-08-21

## 2022-08-21 RX ORDER — ONDANSETRON 2 MG/ML
4 INJECTION INTRAMUSCULAR; INTRAVENOUS EVERY 30 MIN PRN
Status: DISCONTINUED | OUTPATIENT
Start: 2022-08-21 | End: 2022-08-23 | Stop reason: HOSPADM

## 2022-08-21 RX ORDER — AMOXICILLIN 250 MG
2 CAPSULE ORAL 2 TIMES DAILY
Status: DISCONTINUED | OUTPATIENT
Start: 2022-08-21 | End: 2022-08-23 | Stop reason: HOSPADM

## 2022-08-21 RX ORDER — BISACODYL 10 MG
10 SUPPOSITORY, RECTAL RECTAL DAILY PRN
Status: DISCONTINUED | OUTPATIENT
Start: 2022-08-23 | End: 2022-08-23 | Stop reason: HOSPADM

## 2022-08-21 RX ORDER — MORPHINE SULFATE 0.5 MG/ML
150 INJECTION, SOLUTION EPIDURAL; INTRATHECAL; INTRAVENOUS ONCE
Status: DISCONTINUED | OUTPATIENT
Start: 2022-08-21 | End: 2022-08-21 | Stop reason: HOSPADM

## 2022-08-21 RX ORDER — SODIUM CHLORIDE, SODIUM LACTATE, POTASSIUM CHLORIDE, CALCIUM CHLORIDE 600; 310; 30; 20 MG/100ML; MG/100ML; MG/100ML; MG/100ML
INJECTION, SOLUTION INTRAVENOUS CONTINUOUS
Status: DISCONTINUED | OUTPATIENT
Start: 2022-08-21 | End: 2022-08-21 | Stop reason: HOSPADM

## 2022-08-21 RX ORDER — PROCHLORPERAZINE 25 MG
25 SUPPOSITORY, RECTAL RECTAL EVERY 12 HOURS PRN
Status: DISCONTINUED | OUTPATIENT
Start: 2022-08-21 | End: 2022-08-23 | Stop reason: HOSPADM

## 2022-08-21 RX ORDER — ONDANSETRON 4 MG/1
4 TABLET, ORALLY DISINTEGRATING ORAL EVERY 6 HOURS PRN
Status: DISCONTINUED | OUTPATIENT
Start: 2022-08-21 | End: 2022-08-23 | Stop reason: HOSPADM

## 2022-08-21 RX ORDER — CEFAZOLIN SODIUM/WATER 2 G/20 ML
2 SYRINGE (ML) INTRAVENOUS
Status: COMPLETED | OUTPATIENT
Start: 2022-08-21 | End: 2022-08-21

## 2022-08-21 RX ORDER — MORPHINE SULFATE 1 MG/ML
INJECTION, SOLUTION EPIDURAL; INTRATHECAL; INTRAVENOUS
Status: COMPLETED | OUTPATIENT
Start: 2022-08-21 | End: 2022-08-21

## 2022-08-21 RX ORDER — METHYLERGONOVINE MALEATE 0.2 MG/ML
200 INJECTION INTRAVENOUS
Status: DISCONTINUED | OUTPATIENT
Start: 2022-08-21 | End: 2022-08-21 | Stop reason: HOSPADM

## 2022-08-21 RX ORDER — ONDANSETRON 2 MG/ML
INJECTION INTRAMUSCULAR; INTRAVENOUS PRN
Status: DISCONTINUED | OUTPATIENT
Start: 2022-08-21 | End: 2022-08-21

## 2022-08-21 RX ORDER — ACETAMINOPHEN 325 MG/1
975 TABLET ORAL ONCE
Status: COMPLETED | OUTPATIENT
Start: 2022-08-21 | End: 2022-08-21

## 2022-08-21 RX ORDER — SIMETHICONE 80 MG
80 TABLET,CHEWABLE ORAL 4 TIMES DAILY PRN
Status: DISCONTINUED | OUTPATIENT
Start: 2022-08-21 | End: 2022-08-23 | Stop reason: HOSPADM

## 2022-08-21 RX ORDER — CALCIUM CARBONATE 500 MG/1
2 TABLET, CHEWABLE ORAL 2 TIMES DAILY
COMMUNITY

## 2022-08-21 RX ORDER — HYDROCORTISONE 25 MG/G
CREAM TOPICAL 3 TIMES DAILY PRN
Status: DISCONTINUED | OUTPATIENT
Start: 2022-08-21 | End: 2022-08-23 | Stop reason: HOSPADM

## 2022-08-21 RX ORDER — DEXAMETHASONE SODIUM PHOSPHATE 10 MG/ML
INJECTION, SOLUTION INTRAMUSCULAR; INTRAVENOUS PRN
Status: DISCONTINUED | OUTPATIENT
Start: 2022-08-21 | End: 2022-08-21

## 2022-08-21 RX ORDER — METOCLOPRAMIDE 10 MG/1
10 TABLET ORAL EVERY 6 HOURS PRN
Status: DISCONTINUED | OUTPATIENT
Start: 2022-08-21 | End: 2022-08-23 | Stop reason: HOSPADM

## 2022-08-21 RX ORDER — FENTANYL CITRATE-0.9 % NACL/PF 10 MCG/ML
100 PLASTIC BAG, INJECTION (ML) INTRAVENOUS EVERY 5 MIN PRN
Status: DISCONTINUED | OUTPATIENT
Start: 2022-08-21 | End: 2022-08-21 | Stop reason: HOSPADM

## 2022-08-21 RX ORDER — AMOXICILLIN 250 MG
1 CAPSULE ORAL 2 TIMES DAILY
Status: DISCONTINUED | OUTPATIENT
Start: 2022-08-21 | End: 2022-08-23 | Stop reason: HOSPADM

## 2022-08-21 RX ORDER — CARBOPROST TROMETHAMINE 250 UG/ML
250 INJECTION, SOLUTION INTRAMUSCULAR
Status: DISCONTINUED | OUTPATIENT
Start: 2022-08-21 | End: 2022-08-21 | Stop reason: HOSPADM

## 2022-08-21 RX ORDER — CARBOPROST TROMETHAMINE 250 UG/ML
250 INJECTION, SOLUTION INTRAMUSCULAR
Status: DISCONTINUED | OUTPATIENT
Start: 2022-08-21 | End: 2022-08-23 | Stop reason: HOSPADM

## 2022-08-21 RX ORDER — BUPIVACAINE HYDROCHLORIDE 2.5 MG/ML
INJECTION, SOLUTION EPIDURAL; INFILTRATION; INTRACAUDAL
Status: DISCONTINUED | OUTPATIENT
Start: 2022-08-21 | End: 2022-08-21

## 2022-08-21 RX ORDER — SODIUM CHLORIDE, SODIUM LACTATE, POTASSIUM CHLORIDE, CALCIUM CHLORIDE 600; 310; 30; 20 MG/100ML; MG/100ML; MG/100ML; MG/100ML
INJECTION, SOLUTION INTRAVENOUS CONTINUOUS PRN
Status: DISCONTINUED | OUTPATIENT
Start: 2022-08-21 | End: 2022-08-21

## 2022-08-21 RX ORDER — BUPIVACAINE HYDROCHLORIDE 7.5 MG/ML
INJECTION, SOLUTION INTRASPINAL
Status: COMPLETED | OUTPATIENT
Start: 2022-08-21 | End: 2022-08-21

## 2022-08-21 RX ORDER — ACETAMINOPHEN 325 MG/1
650 TABLET ORAL EVERY 4 HOURS PRN
Status: DISCONTINUED | OUTPATIENT
Start: 2022-08-24 | End: 2022-08-23 | Stop reason: HOSPADM

## 2022-08-21 RX ORDER — OXYTOCIN/0.9 % SODIUM CHLORIDE 30/500 ML
PLASTIC BAG, INJECTION (ML) INTRAVENOUS CONTINUOUS PRN
Status: DISCONTINUED | OUTPATIENT
Start: 2022-08-21 | End: 2022-08-21

## 2022-08-21 RX ORDER — LORATADINE 10 MG/1
10 TABLET ORAL DAILY
Status: ON HOLD | COMMUNITY
End: 2022-08-23

## 2022-08-21 RX ORDER — FAMOTIDINE 20 MG/1
20 TABLET, FILM COATED ORAL 2 TIMES DAILY
Status: ON HOLD | COMMUNITY
End: 2022-08-23

## 2022-08-21 RX ORDER — MISOPROSTOL 200 UG/1
800 TABLET ORAL
Status: DISCONTINUED | OUTPATIENT
Start: 2022-08-21 | End: 2022-08-23 | Stop reason: HOSPADM

## 2022-08-21 RX ORDER — HALOPERIDOL 5 MG/ML
1 INJECTION INTRAMUSCULAR
Status: DISCONTINUED | OUTPATIENT
Start: 2022-08-21 | End: 2022-08-23 | Stop reason: HOSPADM

## 2022-08-21 RX ORDER — KETOROLAC TROMETHAMINE 30 MG/ML
INJECTION, SOLUTION INTRAMUSCULAR; INTRAVENOUS PRN
Status: DISCONTINUED | OUTPATIENT
Start: 2022-08-21 | End: 2022-08-21

## 2022-08-21 RX ORDER — MISOPROSTOL 200 UG/1
800 TABLET ORAL
Status: DISCONTINUED | OUTPATIENT
Start: 2022-08-21 | End: 2022-08-21 | Stop reason: HOSPADM

## 2022-08-21 RX ORDER — FENTANYL CITRATE 50 UG/ML
25 INJECTION, SOLUTION INTRAMUSCULAR; INTRAVENOUS EVERY 5 MIN PRN
Status: DISCONTINUED | OUTPATIENT
Start: 2022-08-21 | End: 2022-08-23 | Stop reason: HOSPADM

## 2022-08-21 RX ORDER — METHYLERGONOVINE MALEATE 0.2 MG/ML
200 INJECTION INTRAVENOUS
Status: DISCONTINUED | OUTPATIENT
Start: 2022-08-21 | End: 2022-08-23 | Stop reason: HOSPADM

## 2022-08-21 RX ORDER — OXYTOCIN 10 [USP'U]/ML
10 INJECTION, SOLUTION INTRAMUSCULAR; INTRAVENOUS
Status: DISCONTINUED | OUTPATIENT
Start: 2022-08-21 | End: 2022-08-23 | Stop reason: HOSPADM

## 2022-08-21 RX ADMIN — SODIUM CHLORIDE, POTASSIUM CHLORIDE, SODIUM LACTATE AND CALCIUM CHLORIDE: 600; 310; 30; 20 INJECTION, SOLUTION INTRAVENOUS at 20:38

## 2022-08-21 RX ADMIN — SODIUM CHLORIDE, POTASSIUM CHLORIDE, SODIUM LACTATE AND CALCIUM CHLORIDE: 600; 310; 30; 20 INJECTION, SOLUTION INTRAVENOUS at 21:26

## 2022-08-21 RX ADMIN — BUPIVACAINE HYDROCHLORIDE IN DEXTROSE 1.5 ML: 7.5 INJECTION, SOLUTION SUBARACHNOID at 20:48

## 2022-08-21 RX ADMIN — TRANEXAMIC ACID 1 G: 1 INJECTION, SOLUTION INTRAVENOUS at 20:24

## 2022-08-21 RX ADMIN — ACETAMINOPHEN 975 MG: 325 TABLET ORAL at 20:27

## 2022-08-21 RX ADMIN — SODIUM CHLORIDE 500 MG: 9 INJECTION, SOLUTION INTRAVENOUS at 20:43

## 2022-08-21 RX ADMIN — MORPHINE SULFATE 0.15 MG: 1 INJECTION, SOLUTION EPIDURAL; INTRATHECAL; INTRAVENOUS at 20:48

## 2022-08-21 RX ADMIN — BUPIVACAINE HYDROCHLORIDE 40 ML: 2.5 INJECTION, SOLUTION EPIDURAL; INFILTRATION; INTRACAUDAL at 21:55

## 2022-08-21 RX ADMIN — SODIUM CITRATE AND CITRIC ACID MONOHYDRATE 30 ML: 500; 334 SOLUTION ORAL at 20:32

## 2022-08-21 RX ADMIN — Medication 0.15 MG: at 20:48

## 2022-08-21 RX ADMIN — ACETAMINOPHEN 975 MG: 325 TABLET ORAL at 23:37

## 2022-08-21 RX ADMIN — Medication 300 ML/HR: at 21:11

## 2022-08-21 RX ADMIN — DEXAMETHASONE SODIUM PHOSPHATE 5 MG: 10 INJECTION, SOLUTION INTRAMUSCULAR; INTRAVENOUS at 21:16

## 2022-08-21 RX ADMIN — SODIUM CHLORIDE, POTASSIUM CHLORIDE, SODIUM LACTATE AND CALCIUM CHLORIDE 1000 ML: 600; 310; 30; 20 INJECTION, SOLUTION INTRAVENOUS at 18:33

## 2022-08-21 RX ADMIN — KETOROLAC TROMETHAMINE 15 MG: 30 INJECTION, SOLUTION INTRAMUSCULAR at 22:01

## 2022-08-21 RX ADMIN — ONDANSETRON 4 MG: 2 INJECTION INTRAMUSCULAR; INTRAVENOUS at 20:45

## 2022-08-21 RX ADMIN — PHENYLEPHRINE HYDROCHLORIDE 0.5 MCG/KG/MIN: 10 INJECTION INTRAVENOUS at 20:44

## 2022-08-21 RX ADMIN — Medication 2 G: at 20:43

## 2022-08-21 ASSESSMENT — ACTIVITIES OF DAILY LIVING (ADL)
TOILETING_ISSUES: NO
DOING_ERRANDS_INDEPENDENTLY_DIFFICULTY: NO
CHANGE_IN_FUNCTIONAL_STATUS_SINCE_ONSET_OF_CURRENT_ILLNESS/INJURY: NO
DRESSING/BATHING_DIFFICULTY: NO
ADLS_ACUITY_SCORE: 18
ADLS_ACUITY_SCORE: 35
DIFFICULTY_COMMUNICATING: NO
WEAR_GLASSES_OR_BLIND: NO
CONCENTRATING,_REMEMBERING_OR_MAKING_DECISIONS_DIFFICULTY: NO
FALL_HISTORY_WITHIN_LAST_SIX_MONTHS: NO
DIFFICULTY_EATING/SWALLOWING: NO
WALKING_OR_CLIMBING_STAIRS_DIFFICULTY: NO
ADLS_ACUITY_SCORE: 18

## 2022-08-21 NOTE — PROVIDER NOTIFICATION
María Elena arrived on the unit with her  Myron. Monitors applied.        22 1800   Provider Notification   Provider Name/Title dr. delgado   Method of Notification Phone   Request Evaluate - Remote   Notification Reason Uterine Activity;Labor Status;Membrane Status;Pain;Maternal Vital Sign Change;Patient Arrived;Status Update;Patient Request     RN notified Dr. Delgado of María Elena's arrival. She is a planned  for the first. María Elena noted contractions starting at 2pm.     She arrived 20 minutes ago, joan every 1-3 minutes. She has to breathe through the big ones. Efm is category I. Positive fetal movement. No leaking of fluid or bleeding.    Orders for SVE, labs, and IV fluids

## 2022-08-22 LAB
CREAT SERPL-MCNC: 0.55 MG/DL (ref 0.6–1.1)
GFR SERPL CREATININE-BSD FRML MDRD: >90 ML/MIN/1.73M2
HGB BLD-MCNC: 12.3 G/DL (ref 11.7–15.7)
T PALLIDUM AB SER QL: NONREACTIVE

## 2022-08-22 PROCEDURE — 82565 ASSAY OF CREATININE: CPT | Performed by: OBSTETRICS & GYNECOLOGY

## 2022-08-22 PROCEDURE — 250N000009 HC RX 250: Performed by: OBSTETRICS & GYNECOLOGY

## 2022-08-22 PROCEDURE — 250N000011 HC RX IP 250 OP 636: Performed by: OBSTETRICS & GYNECOLOGY

## 2022-08-22 PROCEDURE — 85018 HEMOGLOBIN: CPT | Performed by: OBSTETRICS & GYNECOLOGY

## 2022-08-22 PROCEDURE — 250N000013 HC RX MED GY IP 250 OP 250 PS 637: Performed by: OBSTETRICS & GYNECOLOGY

## 2022-08-22 PROCEDURE — 36415 COLL VENOUS BLD VENIPUNCTURE: CPT | Performed by: OBSTETRICS & GYNECOLOGY

## 2022-08-22 PROCEDURE — 120N000001 HC R&B MED SURG/OB

## 2022-08-22 RX ORDER — CALCIUM CARBONATE 500 MG/1
500 TABLET, CHEWABLE ORAL 4 TIMES DAILY PRN
Status: DISCONTINUED | OUTPATIENT
Start: 2022-08-22 | End: 2022-08-23 | Stop reason: HOSPADM

## 2022-08-22 RX ORDER — FAMOTIDINE 10 MG
10 TABLET ORAL 2 TIMES DAILY
Status: DISCONTINUED | OUTPATIENT
Start: 2022-08-22 | End: 2022-08-23 | Stop reason: HOSPADM

## 2022-08-22 RX ADMIN — IBUPROFEN 800 MG: 800 TABLET ORAL at 18:07

## 2022-08-22 RX ADMIN — SENNOSIDES AND DOCUSATE SODIUM 2 TABLET: 8.6; 5 TABLET ORAL at 20:50

## 2022-08-22 RX ADMIN — METOCLOPRAMIDE HYDROCHLORIDE 10 MG: 5 INJECTION INTRAMUSCULAR; INTRAVENOUS at 00:34

## 2022-08-22 RX ADMIN — ACETAMINOPHEN 975 MG: 325 TABLET ORAL at 18:07

## 2022-08-22 RX ADMIN — SENNOSIDES AND DOCUSATE SODIUM 2 TABLET: 8.6; 5 TABLET ORAL at 08:33

## 2022-08-22 RX ADMIN — ACETAMINOPHEN 975 MG: 325 TABLET ORAL at 05:51

## 2022-08-22 RX ADMIN — SENNOSIDES AND DOCUSATE SODIUM 2 TABLET: 8.6; 5 TABLET ORAL at 00:34

## 2022-08-22 RX ADMIN — KETOROLAC TROMETHAMINE 30 MG: 30 INJECTION, SOLUTION INTRAMUSCULAR at 11:48

## 2022-08-22 RX ADMIN — FAMOTIDINE 10 MG: 10 TABLET, FILM COATED ORAL at 08:32

## 2022-08-22 RX ADMIN — CALCIUM CARBONATE (ANTACID) CHEW TAB 500 MG 500 MG: 500 CHEW TAB at 06:12

## 2022-08-22 RX ADMIN — KETOROLAC TROMETHAMINE 30 MG: 30 INJECTION, SOLUTION INTRAMUSCULAR at 05:51

## 2022-08-22 RX ADMIN — Medication 340 ML/HR: at 00:35

## 2022-08-22 RX ADMIN — Medication: at 00:34

## 2022-08-22 RX ADMIN — ACETAMINOPHEN 975 MG: 325 TABLET ORAL at 11:48

## 2022-08-22 RX ADMIN — FAMOTIDINE 10 MG: 10 TABLET, FILM COATED ORAL at 20:51

## 2022-08-22 ASSESSMENT — ACTIVITIES OF DAILY LIVING (ADL)
ADLS_ACUITY_SCORE: 18

## 2022-08-22 NOTE — PROGRESS NOTES
Pt got up for the first time after surgery without difficulties. Attempted to void, unable at this time. Encouraged to increase water intake, and to call nurse when needing to get out of the bed next time. IV saline locked.

## 2022-08-22 NOTE — H&P
OB ADMISSION H&P - C SECTION     Date: 2022  NAME: María Elena Amaya   : 1995  MRN: [id number]     CC: contractions; scheduled CS 22     HPI: María Elena Amaya is a 27 year old  with  single inter-uterine gestation at 37w4d, with Estimated Date of Delivery: Sep 7, 2022 admitted today for contracations which began this afternnoon, increasing in frequency and intensity.  Has a third repeat CS scheduled 22. First delivery was by CS in  for fetal intolerance, then had an elective repeat in .  Patient feels well. Has no complaints and reports good fetal movement. Pregnancy has been uncomplicated. Did not pass GCT but passed GTT.  One US showed mild polyhydramnios, then resolved. Patient denies headache, visual changes, RUQ pain. She denies contractions, leakage of fluid, or vaginal bleeding. Please see prenatal records.     OB HISTORY   OB History    Para Term  AB Living   3 2 2 0 0 2   SAB IAB Ectopic Multiple Live Births   0 0 0 0 2      # Outcome Date GA Lbr Ammon/2nd Weight Sex Delivery Anes PTL Lv   3 Current            2 Term 20 39w1d  3.45 kg (7 lb 9.7 oz) M CS-LTranv Spinal N AMINTA      Name: CARL AMAYA      Apgar1: 8  Apgar5: 9   1 Term 19 39w3d  3.48 kg (7 lb 10.8 oz) M  EPI N AMINTA      Name: CARL AMAYA      Apgar1: 8  Apgar5: 9       PAST MEDICAL HISTORY  History reviewed. No pertinent past medical history.     PAST SURGICAL HISTORY:   Past Surgical History:   Procedure Laterality Date      SECTION N/A 2019    Procedure:  SECTION;  Surgeon: Zulema Delgado MD;  Location: [facility];  Service: Obstetrics      SECTION N/A 2020    Procedure: REPEAT  SECTION;  Surgeon: Sylvie Leon MD;  Location: [facility];  Service: Obstetrics     NO HISTORY OF SURGERY          SOCIAL HISTORY   Reviewed, patient denies smoking and drug use  She is  . Father is   "involved    MEDICATIONS  Current Facility-Administered Medications   Medication     acetaminophen (TYLENOL) tablet 975 mg     azithromycin (ZITHROMAX) 500 mg in sodium chloride 0.9 % 250 mL intermittent infusion     carboprost (HEMABATE) injection 250 mcg     ceFAZolin Sodium (ANCEF) injection 2 g     ceFAZolin Sodium (ANCEF) injection 2 g     lactated ringers BOLUS 1,000 mL     lactated ringers BOLUS 250 mL     lactated ringers infusion     lactated ringers infusion     lidocaine (LMX4) cream     lidocaine 1 % 0.1-1 mL     lidocaine 1 % 0.1-1 mL     medication instruction     methylergonovine (METHERGINE) injection 200 mcg     misoprostol (CYTOTEC) tablet 400 mcg    Or     misoprostol (CYTOTEC) tablet 800 mcg     morphine (PF) (DURAMORPH) injection 150 mcg     nalbuphine (NUBAIN) injection 2.5-5 mg     naloxone (NARCAN) injection 0.2 mg    Or     naloxone (NARCAN) injection 0.4 mg    Or     naloxone (NARCAN) injection 0.2 mg    Or     naloxone (NARCAN) injection 0.4 mg     oxytocin (PITOCIN) 30 units in 500 mL 0.9% NaCl infusion     oxytocin (PITOCIN) injection 10 Units     phenylephrine (AUSTEN-SYNEPHRINE) injection 100 mcg     sodium chloride (PF) 0.9% PF flush 3 mL     sodium chloride (PF) 0.9% PF flush 3 mL     sodium chloride (PF) 0.9% PF flush 3 mL     sodium citrate-citric acid (BICITRA) solution 30 mL     tranexamic acid (CYKLOKAPRON) bolus 1 g vial attach to NaCl 50 or 100 mL bag ADULT       ALLERGIES  Allergies   Allergen Reactions     Sulfa Drugs Rash       ROS: otherwise negative except what is stated in HPI.     PHYSICAL EXAM   /76 (BP Location: Right arm)   Pulse 77   Temp 98.8  F (37.1  C) (Oral)   Resp 18   Ht 1.549 m (5' 1\")   Wt 89.8 kg (198 lb)   Breastfeeding No   BMI 37.41 kg/m     Gen: no acute distress, resting comfortably   CV: acyanotic   Heart: regular rate and rhythm   Pulm: unlabored respirations, clear to ausculation bilaterally    Abd: gravid, soft, nontender   Extremities: " soft, nontender   FHR: positive, category 1  [place]: regular contractions, breathing through them      LABS  hgb 13.8  Platelets 215,000  PTT 26  INR .98  UPC undetectable  Covid negative  GBS positive    IMPRESSION:   27 year old  at 37w4d   single inter uterine pregnancy at term   Pregnancy complications include: history of two prior CSs, plans third repeat. Passed GTT, mild poly which resolved   section secondary to latent labor    PLAN:   - Admit to hospital  - Type and screen/hgb  - NPO   - Proceed with third repeat  section   - anesthesia notified       RISK - C SECTION  Patient counseled on risks, benefits, alternatives and expectations of  section.  Risks detailed to include, but not be limited to:  Pain, bleeding, infection, anesthesia complications, possible injury to bowel, bladder, baby and/or adjacent tissues, possible need for blood transfusion (with 1/50,000 risk of bloodborne pathogen [HIV and/or Hepatitis B/C] transmission) or even hysterectomy.  Patient voiced understanding of all R/B/A/E and has agreed to proceed with  section for delivery if needed or recommended.      Zulema Delgado MD

## 2022-08-22 NOTE — ANESTHESIA CARE TRANSFER NOTE
Patient: María Elena BANDA Lapamahin    Procedure: Procedure(s):   SECTION       Diagnosis: Delivered by  delivery following previous  delivery [O34.219]  Diagnosis Additional Information: No value filed.    Anesthesia Type:   Spinal     Note:    Oropharynx: spontaneously breathing  Level of Consciousness: awake  Oxygen Supplementation: room air    Independent Airway: airway patency satisfactory and stable  Dentition: dentition unchanged  Vital Signs Stable: post-procedure vital signs reviewed and stable  Report to RN Given: handoff report given  Patient transferred to: PACU  Comments: Pt. [place] and breathing spontaneously.  Vitals stable.  Report given to oncoming nurse.  Transfer of care occurred.   Handoff Report: Identifed the Patient, Identified the Reponsible Provider, Reviewed the pertinent medical history, Discussed the surgical course, Reviewed Intra-OP anesthesia mangement and issues during anesthesia, Set expectations for post-procedure period and Allowed opportunity for questions and acknowledgement of understanding      Vitals:  Vitals Value Taken Time   /68 22 2215   Temp 97.8    Pulse 73    Resp 12    SpO2 99 % 22 2215   Vitals shown include unvalidated device data.    Electronically Signed By: FAN Castro CRNA  2022  10:18 PM

## 2022-08-22 NOTE — PLAN OF CARE
María Elena is comfortable and pain is well controlled this evening.  She is up and moving independently in her room with FOB and a grandmother at the bedside.  She is voiding without difficulty.  Fundus is firm, bleeding is normal, and vitals are stable.  She denies any nausea and is eating a regular diet.        Problem: Plan of Care - These are the overarching goals to be used throughout the patient stay.    Goal: Optimal Comfort and Wellbeing  Outcome: Ongoing, Progressing  Intervention: Provide Person-Centered Care  Recent Flowsheet Documentation  Taken 2022 1600 by Nilam Carr, RN  Trust Relationship/Rapport:   care explained   emotional support provided     Problem: Postoperative Urinary Retention (Postpartum  Delivery)  Goal: Effective Urinary Elimination  Outcome: Met

## 2022-08-22 NOTE — ANESTHESIA PREPROCEDURE EVALUATION
Anesthesia Pre-Procedure Evaluation    Patient: María Elena Camacho   MRN: [id number] : 1995        Procedure : Procedure(s):   SECTION          History reviewed. No pertinent past medical history.   Past Surgical History:   Procedure Laterality Date      SECTION N/A 2019    Procedure:  SECTION;  Surgeon: Zulema Delgado MD;  Location: [facility];  Service: Obstetrics      SECTION N/A 2020    Procedure: REPEAT  SECTION;  Surgeon: Sylvie Leon MD;  Location: [facility];  Service: Obstetrics     NO HISTORY OF SURGERY        Allergies   Allergen Reactions     Sulfa Drugs Rash      Social History     Tobacco Use     Smoking status: Never Smoker     Smokeless tobacco: Never Used   Substance Use Topics     Alcohol use: Never     Comment: Social      Wt Readings from Last 1 Encounters:   22 89.8 kg (198 lb)        Anesthesia Evaluation   Pt has had prior anesthetic.     History of anesthetic complications (Nausea after previous )  - PONV.      ROS/MED HX  ENT/Pulmonary:  - neg pulmonary ROS     Neurologic:  - neg neurologic ROS     Cardiovascular:  - neg cardiovascular ROS     METS/Exercise Tolerance:     Hematologic:  - neg hematologic  ROS     Musculoskeletal:  - neg musculoskeletal ROS     GI/Hepatic:     (+) GERD,     Renal/Genitourinary:  - neg Renal ROS     Endo:     (+) Obesity (BMI 37),     Psychiatric/Substance Use:       Infectious Disease:       Malignancy:       Other:      (+) Possibly pregnant ( x 2, having contractions), ,         Physical Exam    Airway        Mallampati: II   TM distance: > 3 FB   Neck ROM: full   Mouth opening: > 3 cm    Respiratory Devices and Support         Dental     Comment: Good condition    (+) lower retainer    B=Bridge, C=Chipped, L=Loose, M=Missing    Cardiovascular   cardiovascular exam normal          Pulmonary   pulmonary exam normal                OUTSIDE LABS:  CBC:    Lab Results   Component Value Date    WBC 16.4 (H) 08/21/2022    WBC 12.1 (H) 03/25/2019    HGB 13.8 08/21/2022    HGB 10.4 (L) 04/15/2020    HCT 40.6 08/21/2022    HCT 36.1 03/25/2019     08/21/2022     03/25/2019     BMP:   Lab Results   Component Value Date    CR 0.66 03/25/2019    GLC 74 04/11/2019     COAGS:   Lab Results   Component Value Date    PTT 26 08/21/2022    INR 0.98 08/21/2022     POC: No results found for: BGM, HCG, HCGS  HEPATIC:   Lab Results   Component Value Date    ALT 12 08/21/2022    AST 16 08/21/2022     OTHER: No results found for: PH, LACT, A1C, ROMA, PHOS, MAG, LIPASE, AMYLASE, TSH, T4, T3, CRP, SED    Anesthesia Plan    ASA Status:  2, emergent    NPO Status:  Will be NPO Appropriate at ... 8/21/2022 9:00 PM   Anesthesia Type: Spinal.              Consents    Anesthesia Plan(s) and associated risks, benefits, and realistic alternatives discussed. Questions answered and patient/representative(s) expressed understanding.     - Discussed: Risks, Benefits and Alternatives for BOTH SEDATION and the PROCEDURE were discussed     - Discussed with:  Patient    Use of blood products discussed: Yes.     - Discussed with: Patient.     - Consented: consented to blood products            Reason for refusal: other.     Postoperative Care    Pain management: Peripheral nerve block (Single Shot), intrathecal morphine.     - Plan for long acting post-op opioid use   PONV prophylaxis: Ondansetron (or other 5HT-3)     Comments:    Other Comments: Pt consents to ITN duramorph and bilateral TAP blocks for post op pain control              America Downs MD

## 2022-08-22 NOTE — PLAN OF CARE
Problem: Infection (Postpartum  Delivery)  Goal: Absence of Infection Signs and Symptoms  Outcome: Ongoing, Progressing   S/P  incision C/D/I, open to air.      Problem: Pain (Postpartum  Delivery)  Goal: Acceptable Pain Control  Intervention: Prevent or Manage Pain  Recent Flowsheet Documentation  Taken 2022 1148 by Cynthia Mo, RN  Pain Management Interventions: medication (see MAR)  Taken 2022 0830 by Cynthia Mo RN  Pain Management Interventions: declines   Minimal pain 2/10 incisional pain- medicated with Toradol IV/Tylenol with relief. Fundas at Umbilicus. Flow- moderate rubra. Voiding large amounts (1000mL) without any issues post lorenzo removal.       Problem: Postoperative Nausea and Vomiting (Postpartum  Delivery)  Goal: Nausea and Vomiting Relief  Outcome: Ongoing, Progressing   Pt denies N/V. Feeling better throughout the day. Tolerating lunch.      Problem: Adjustment to Role Transition (Postpartum  Delivery)  Goal: Successful Maternal Role Transition  Outcome: Ongoing, Progressing   Breastfeeding every 2-3 hrs & Pumping in between.    Cynthia Mo, RN

## 2022-08-22 NOTE — PROGRESS NOTES
" Postpartum Day 1    Patient Name:  María Elena Camacho  :  1995  MRN:  [id number]      Assessment:  Normal postpartum course.    Plan:  Continue current care.    Subjective:  The patient feels well:  Voiding without difficulty, lochia normal, tolerating normal diet, and passing flatus.  Pain is well controlled with current medications.  The patient has no emotional concerns.  The baby is well and being fed by breast.    Objective:  /57 (BP Location: Right arm, Patient Position: Semi-Boles's, Cuff Size: Adult Regular)   Pulse 77   Temp 97.9  F (36.6  C) (Oral)   Resp 18   Ht 1.549 m (5' 1\")   Wt 89.8 kg (198 lb)   SpO2 97%   Breastfeeding Unknown   BMI 37.41 kg/m    Patient Vitals for the past 24 hrs:   BP Temp Temp src Pulse Resp SpO2 Height Weight   22 0830 107/57 97.9  F (36.6  C) Oral 77 18 97 % -- --   22 0612 -- -- -- -- -- 97 % -- --   22 0608 122/64 -- -- -- -- -- -- --   22 0607 -- -- -- -- -- 97 % -- --   22 0555 -- -- -- -- -- 97 % -- --   22 0550 -- -- -- -- -- 97 % -- --   22 0545 -- -- -- -- -- 97 % -- --   22 0540 -- -- -- -- -- 98 % -- --   22 0535 -- -- -- -- -- 98 % -- --   22 0530 -- -- -- -- -- 98 % -- --   22 0525 -- -- -- -- -- 96 % -- --   22 0520 -- -- -- -- -- 97 % -- --   22 0515 -- -- -- -- -- 96 % -- --   22 -- -- -- -- -- 96 % -- --   22 0505 -- -- -- -- -- 96 % -- --   22 0500 -- -- -- -- -- 96 % -- --   225 -- -- -- -- -- 96 % -- --   22 -- -- -- -- -- 96 % -- --   225 -- -- -- -- -- 96 % -- --   22 -- -- -- -- -- 96 % -- --   225 -- -- -- -- -- 96 % -- --   22 -- -- -- -- -- 96 % -- --   225 -- -- -- -- -- 96 % -- --   22 -- -- -- -- -- 96 % -- --   225 -- -- -- -- -- 96 % -- --   220 -- -- -- -- -- 95 % -- --   225 -- -- -- -- -- 96 % -- -- "   08/22/22 0400 -- -- -- -- -- 96 % -- --   08/22/22 0330 -- -- -- -- -- 97 % -- --   08/22/22 0326 109/56 98  F (36.7  C) Oral -- 16 -- -- --   08/22/22 0325 -- -- -- -- -- 96 % -- --   08/22/22 0320 -- -- -- -- -- 98 % -- --   08/22/22 0315 -- -- -- -- -- 96 % -- --   08/22/22 0310 -- -- -- -- -- 96 % -- --   08/22/22 0305 -- -- -- -- -- 96 % -- --   08/22/22 0300 -- -- -- -- -- 95 % -- --   08/22/22 0255 -- -- -- -- -- 96 % -- --   08/22/22 0250 -- -- -- -- -- 96 % -- --   08/22/22 0245 -- -- -- -- -- 97 % -- --   08/22/22 0240 -- -- -- -- -- 97 % -- --   08/22/22 0235 -- -- -- -- -- 97 % -- --   08/22/22 0233 111/58 98.1  F (36.7  C) Oral -- 16 -- -- --   08/22/22 0230 -- -- -- -- -- 97 % -- --   08/22/22 0225 -- -- -- -- -- 98 % -- --   08/22/22 0220 -- -- -- -- -- 95 % -- --   08/22/22 0215 -- -- -- -- -- 98 % -- --   08/22/22 0210 -- -- -- -- -- 97 % -- --   08/22/22 0205 -- -- -- -- -- 97 % -- --   08/22/22 0200 -- -- -- -- -- 97 % -- --   08/22/22 0155 -- -- -- -- -- 97 % -- --   08/22/22 0150 -- -- -- -- -- 97 % -- --   08/22/22 0145 -- -- -- -- -- 96 % -- --   08/22/22 0140 -- -- -- -- -- 96 % -- --   08/22/22 0135 -- -- -- -- -- 96 % -- --   08/22/22 0130 107/56 -- -- -- -- 96 % -- --   08/22/22 0045 -- -- -- -- -- 97 % -- --   08/22/22 0030 120/62 -- -- -- -- 98 % -- --   08/22/22 0015 -- -- -- -- -- 96 % -- --   08/22/22 0000 119/66 -- -- -- -- 97 % -- --   08/21/22 2345 -- -- -- -- -- 98 % -- --   08/21/22 2335 -- -- -- -- -- 98 % -- --   08/21/22 2330 -- -- -- -- -- 98 % -- --   08/21/22 2325 -- -- -- -- -- 98 % -- --   08/21/22 2320 -- -- -- -- -- 98 % -- --   08/21/22 2318 120/69 -- -- -- -- -- -- --   08/21/22 2315 -- -- -- -- -- 98 % -- --   08/21/22 2310 -- -- -- -- -- 98 % -- --   08/21/22 2305 -- -- -- -- -- 97 % -- --   08/21/22 2303 118/66 -- -- -- -- -- -- --   08/21/22 2300 -- -- -- -- -- 97 % -- --   08/21/22 2255 -- -- -- -- -- 98 % -- --   08/21/22 2250 -- -- -- -- -- 98 % -- --  "  08/21/22 2245 -- -- -- -- -- 98 % -- --   08/21/22 2242 117/78 -- -- -- -- -- -- --   08/21/22 2240 -- -- -- -- -- 98 % -- --   08/21/22 2237 114/72 -- -- -- -- -- -- --   08/21/22 2235 -- -- -- -- -- 97 % -- --   08/21/22 2233 98/57 -- -- -- -- -- -- --   08/21/22 2232 105/58 97.5  F (36.4  C) Oral -- 18 -- -- --   08/21/22 2230 -- -- -- -- -- 98 % -- --   08/21/22 2220 -- -- -- -- -- 98 % -- --   08/21/22 2215 128/68 -- -- -- -- 99 % -- --   08/21/22 1748 139/76 98.8  F (37.1  C) Oral 77 18 -- 1.549 m (5' 1\") 89.8 kg (198 lb)       The amount and color of the lochia is appropriate for the duration of recovery.  The uterine fundus is firm.  Urinary output is adequate.  The sutured incision is clean, dry and intact.    Lab Results   Component Value Date    AS Negative 08/21/2022    HGB 12.3 08/22/2022       Immunization History   Administered Date(s) Administered     COVID-19,PF,Pfizer (12+ Yrs) 03/29/2021, 04/19/2021, 12/29/2021     DTAP (<7y) 1995, 1993, 02/22/1994, 02/22/1995, 08/24/1998     FLU 6-35 months 10/16/2015, 10/27/2016     Flu, Unspecified 11/27/2018     HPV Quadrivalent 10/05/2007, 12/10/2007, 05/20/2008     Hep B, Peds or Adolescent 1993, 1993, 05/23/1994     HepA-ped 2 Dose 03/04/2005, 10/05/2007     Hib, Unspecified 1995, 1993, 11/25/1994, 02/22/1995     Influenza (IIV3) PF 10/27/2016     Influenza Vaccine IM > 6 months Valent IIV4 (Alfuria,Fluzone) 09/15/2014, 09/20/2017, 11/10/2020, 12/29/2021     MMR 11/25/1994, 03/04/2005     Meningococcal (Menactra ) 10/05/2007     Poliovirus, inactivated (IPV) 1993, 1993, 02/22/1995, 08/24/1998     TDAP Vaccine (Adacel) 03/04/2005, 06/18/2010     Tdap (Adacel,Boostrix) 02/05/2019, 01/28/2020     Varicella 09/04/1996, 10/05/2007       Provider:  Sergio Crum CNM      Date:  8/22/2022  Time:  9:54 AM  "

## 2022-08-22 NOTE — ANESTHESIA PROCEDURE NOTES
Intrathecal injection Procedure Note    Pre-Procedure   Staff -        Anesthesiologist:  America Downs MD       Performed By: anesthesiologist       Location: OR       Procedure Start/Stop Times: 8/21/2022 8:43 PM and 8/21/2022 8:48 PM       Pre-Anesthestic Checklist: patient identified, IV checked, risks and benefits discussed, informed consent, monitors and equipment checked, pre-op evaluation, at physician/surgeon's request and post-op pain management  Timeout:       Correct Patient: Yes        Correct Procedure: Yes        Correct Site: Yes        Correct Position: Yes   Procedure Documentation  Procedure: intrathecal injection       Patient Position: sitting       Patient Prep/Sterile Barriers: sterile gloves, mask, patient draped       Skin prep: Chloraprep       Insertion Site: L3-4. (midline approach).       Needle Gauge: 25.        Needle Length (Inches): 3        Spinal Needle Type: Pencan       Introducer used       Introducer: 20 G       # of attempts: 2 and  # of redirects:  0    Assessment/Narrative         Paresthesias: Yes and Resolved (transient after positive CSF, when patient repositioned herself.  Needle was withdrawn and second attempt without paresthesia.).       Sensory Level: T4       CSF fluid: clear.    Medication(s) Administered   0.75% Hyperbaric Bupivacaine (Intrathecal) - Intrathecal   1.5 mL - 8/21/2022 8:48:00 PM  Morphine PF 1 mg/mL (Intrathecal) - Intrathecal   0.15 mg - 8/21/2022 8:48:00 PM  Medication Administration Time: 8/21/2022 8:43 PM

## 2022-08-22 NOTE — OP NOTE
Section Operative Note      Pre-operative Diagnosis: 1.  Intrauterine pregnancy 37 week 4 days gestation  2.  Latent labor  3.  History of two prior CSs    Post-operative Diagnosis: same, delivered    Procedure:  Third repeat low segment transverse  section    Surgeon:  Zulema Delgado M.D.    Anesthesia:  spinal    Estimated Blood Loss:  qbl 815cc    Complications:  None; patient tolerated the procedure well.    Indications for surgery:  María Elena is a 27yr old  at 37+4wks admitted this evening in spontaneous latent labor. Onset of ctxs at 1400, increasing in frequency and intensity. Breathing through some contractions on admit. History of two prior CSs in  and . Scheduled for delivery by third repeat CS 22. Given her contractions/latent labor, recommended proceeding with delivery by repeat CS tonight. She agreed.   The risks and benefits were discussed and consent obtained to proceed.    Findings:  7#7oz viable female delivered from ROT vertex presentation at 2111hrs. Large amount of clear AF. Bulb suctioned,no nuchal cord. Vigorous, apgars 9,9.  Spontaneous delivery of intact placenta with 3VC. Integrity of lower uterine segment thin but intact. Tubes and ovaries normal bilaterally.  Large amount of adhesions and dense scar tissue in abdominal wall layers.      [unfilled]  Procedure Details   The patient was taken to the Operating Room on nursing home, identified as María Elena BANDA Lapadat and the procedure verified as  Delivery. A Time Out was held and the above information confirmed.    After administration of spinal anesthesia, the patient was positioned in the left lateral tilt position and was prepped and draped in the usual sterile fashion. A Pfannenstiel incision was made through the previous scar line and carried down sharply through the subcutaneous tissue.  The fascia was incised horizontally, and the rectus abdominis muscles bluntly and sharply dissected away from the  fascia superiorly and inferiorly to the pubic symphysis. Very thick firm scar tissue with the dissection.  The rectus muscles were  in the midline, and the peritoneum was identified.  A vertical peritoneal incision was made and extended superiorly and inferiorly to the upper edge of the bladder with stretching.  The vesicouterine peritoneal reflection was incised transversely and the bladder flap was bluntly dissected away from the lower uterine segment.     Lower uterine segment was thin but intact. A horizontal incision was made in the lower uterine segment, and the incision was extended bilaterally in a curvilinear fashion with the bandage scissors.  Membranes were ruptured and the amnoitic fluid was  clear.  Moderate to large amount of fluid. The infant was delivered from a ROT vertex presentation at 2111 hrs.  There was no nuchal cord.  Mouth and nares were bulb suctioned and cord doubly clamped and cut after 1 minute delay.  The infant was passed off to the Pediatric team in attendance with findings as noted above.      The placenta was delivered spontaneously, intact, with a 3 vessel cord.  The uterine cavity was wiped free of remaining clot and membrane.  The uterus was exteriorized and the cut edges of the uterine incision were grasped.  There were no extensions of the uterine incision.  The uterus was closed with a double layer of continuous running locked 0 vicryl, the second imbricating the first.  The uterine incision was inspected and all was hemostatic.  The Fallopian tubes and ovaries were examined and appeared normal.  The bladder flap was inspected and was hemostatic.  The uterus was replaced into the abdominal cavity.  The pericolic gutters were swabbed clean.  The uterine incision was once again inspected once back in its normal anatomic position and was hemostatic. Attempted to close the parietal peritoneum but it was tight due to scar and the sutures were pulling through, so those  sutures were removed and will allow the peritoneum to heal on its own. The subfascial space was inspected and hemostasis achieved with electrocautery and Surgicel hemostatic powder.  The fascia was closed with two pieces of continuous running 0 vicryl, starting at each corner and meeting in the midline.  The subcutaneous tissue was irrigated and hemostasis achieved with electrocautery.  The subcu was undermined to loosen up the dense scar in that layer, then was reapproximated with simple interrupted 3.0 plain.  Skin edges reapproximated with subcuticular 4.0 monocryl.  The incision was dressed with Exofin and in island dressing.    Estimated blood loss was qbl 815cc.  Sponge and needle counts were correct.  There were no complications.  The patient tolerated the procedure well and was transferred to her recovery room in good condition.  The infant will be under [facility] status.      Zulema Delgado M.D.

## 2022-08-22 NOTE — ANESTHESIA PROCEDURE NOTES
TAP Procedure Note    Pre-Procedure   Staff -        Anesthesiologist:  America Downs MD       Performed By: anesthesiologist       Location: OR       Procedure Start/Stop Times: 8/21/2022 9:55 PM and 8/21/2022 10:00 PM       Pre-Anesthestic Checklist: patient identified, IV checked, site marked, risks and benefits discussed, informed consent, monitors and equipment checked, pre-op evaluation, at physician/surgeon's request and post-op pain management  Timeout:       Correct Patient: Yes        Correct Procedure: Yes        Correct Site: Yes        Correct Position: Yes        Correct Laterality: Yes        Site Marked: Yes  Procedure Documentation  Procedure: TAP       Laterality: bilateral       Patient Position: supine       Patient Prep/Sterile Barriers: sterile gloves, mask       Skin prep: Chloraprep       Needle Type: other (Stimuplex echogenic)       Needle Gauge: 20.        Needle Length (Inches): 4        Ultrasound guided       1. Ultrasound was used to identify targeted nerve, plexus, vascular marker, or fascial plane and place a needle adjacent to it in real-time.       2. Ultrasound was used to visualize the spread of anesthetic in close proximity to the above referenced structure.       3. A permanent image is entered into the patient's record.       4. The visualized anatomic structures appeared normal.       5. There were no apparent abnormal pathologic findings.    Assessment/Narrative         The placement was negative for: blood aspirated, painful injection and site bleeding       Paresthesias: No.       Bolus given via needle..        Secured via.        Insertion/Infusion Method: Single Shot       Complications: none       Injection made incrementally with aspirations every 5 mL.    Medication(s) Administered   Bupivacaine 0.25% PF (Infiltration) - Infiltration   40 mL - 8/21/2022 9:55:00 PM  Medication Administration Time: 8/21/2022 9:55 PM

## 2022-08-23 VITALS
DIASTOLIC BLOOD PRESSURE: 63 MMHG | TEMPERATURE: 97.6 F | OXYGEN SATURATION: 95 % | RESPIRATION RATE: 18 BRPM | WEIGHT: 198 LBS | HEART RATE: 76 BPM | SYSTOLIC BLOOD PRESSURE: 114 MMHG | HEIGHT: 61 IN | BODY MASS INDEX: 37.38 KG/M2

## 2022-08-23 PROBLEM — Z34.90 PREGNANT: Status: RESOLVED | Noted: 2019-04-07 | Resolved: 2022-08-23

## 2022-08-23 PROBLEM — Z36.89 ENCOUNTER FOR TRIAGE IN PREGNANT PATIENT: Status: RESOLVED | Noted: 2022-08-21 | Resolved: 2022-08-23

## 2022-08-23 PROCEDURE — 250N000013 HC RX MED GY IP 250 OP 250 PS 637: Performed by: OBSTETRICS & GYNECOLOGY

## 2022-08-23 RX ORDER — AMOXICILLIN 250 MG
1-2 CAPSULE ORAL 2 TIMES DAILY PRN
COMMUNITY
Start: 2022-08-23

## 2022-08-23 RX ORDER — IBUPROFEN 800 MG/1
800 TABLET, FILM COATED ORAL EVERY 6 HOURS PRN
COMMUNITY
Start: 2022-08-23

## 2022-08-23 RX ORDER — OXYCODONE HYDROCHLORIDE 5 MG/1
5 TABLET ORAL EVERY 4 HOURS PRN
Qty: 10 TABLET | Refills: 0 | Status: ON HOLD | OUTPATIENT
Start: 2022-08-23 | End: 2024-10-04

## 2022-08-23 RX ORDER — ACETAMINOPHEN 325 MG/1
975 TABLET ORAL EVERY 6 HOURS PRN
COMMUNITY
Start: 2022-08-23

## 2022-08-23 RX ADMIN — IBUPROFEN 800 MG: 800 TABLET ORAL at 06:38

## 2022-08-23 RX ADMIN — ACETAMINOPHEN 975 MG: 325 TABLET ORAL at 06:37

## 2022-08-23 RX ADMIN — IBUPROFEN 800 MG: 800 TABLET ORAL at 00:36

## 2022-08-23 RX ADMIN — ACETAMINOPHEN 975 MG: 325 TABLET ORAL at 00:35

## 2022-08-23 RX ADMIN — FAMOTIDINE 10 MG: 10 TABLET, FILM COATED ORAL at 08:24

## 2022-08-23 RX ADMIN — SENNOSIDES AND DOCUSATE SODIUM 1 TABLET: 50; 8.6 TABLET ORAL at 08:23

## 2022-08-23 ASSESSMENT — ACTIVITIES OF DAILY LIVING (ADL)
ADLS_ACUITY_SCORE: 18

## 2022-08-23 ASSESSMENT — EDINBURGH POSTNATAL DEPRESSION SCALE (EPDS): TOTAL SCORE: 0

## 2022-08-23 NOTE — DISCHARGE SUMMARY
OB Discharge Summary      Date:  2022    Name:  María Elena Camacho  :  1995  MRN:  [id number]      Admission Date:  2022  Delivery Date: 2022  Gestational Age at Delivery:  37w4d  Discharge Date:  2022    Principal Diagnosis:    Patient Active Problem List   Diagnosis      delivery delivered     S/P repeat low transverse      Conditions complicating Pregnancy:  Hx of   Mild poly, resolved    Procedure(s) Performed:  Repeat LTCS    Indication for :  Latent labor  Indication for Induction:  N/A     Condition at Discharge:  Stable/well    Discharge Medications:      Review of your medicines      START taking      Dose / Directions   acetaminophen 325 MG tablet  Commonly known as: TYLENOL  Used for:  delivery delivered      Dose: 975 mg  Take 3 tablets (975 mg) by mouth every 6 hours as needed for mild pain  Refills: 0     ibuprofen 800 MG tablet  Commonly known as: ADVIL/MOTRIN  Used for:  delivery delivered      Dose: 800 mg  Take 1 tablet (800 mg) by mouth every 6 hours as needed for other (cramping)  Refills: 0     oxyCODONE 5 MG tablet  Commonly known as: ROXICODONE  Used for:  delivery delivered      Dose: 5 mg  Take 1 tablet (5 mg) by mouth every 4 hours as needed for moderate to severe pain  Quantity: 10 tablet  Refills: 0     senna-docusate 8.6-50 MG tablet  Commonly known as: SENOKOT-S/PERICOLACE  Used for:  delivery delivered      Dose: 1-2 tablet  Take 1-2 tablets by mouth 2 times daily as needed for constipation  Refills: 0        CONTINUE these medicines which have NOT CHANGED      Dose / Directions   calcium carbonate 500 MG chewable tablet  Commonly known as: TUMS      Dose: 2 chew tab  Take 2 chew tab by mouth 2 times daily  Refills: 0     PRENATAL PO      Refills: 0        STOP taking    famotidine 20 MG tablet  Commonly known as: PEPCID        loratadine 10 MG tablet  Commonly known as: CLARITIN               Where to get your medicines      Some of these will need a paper prescription and others can be bought over the counter. Ask your nurse if you have questions.    Bring a paper prescription for each of these medications    oxyCODONE 5 MG tablet  You don't need a prescription for these medications    acetaminophen 325 MG tablet    ibuprofen 800 MG tablet    senna-docusate 8.6-50 MG tablet          Discharge Plan:    Follow up with /MATI:  =At 2 weeks for incision check and 6 weeks for OB post delivery checks with [facility] provider.    Patient Instructions:      Physical activity: No lifting > 15 lbs, no driving on narcotics.     Diet:  As tolerated.     Medication:  As listed above. May alternate ibuprofen and acetaminophen for pain management. Written script for oxycodone PRN.     Other:        Physician/CNM:  FAN Mulligan CNM    Name:  María Elena Camacho  :  1995  MRN:  [id number]

## 2022-08-23 NOTE — PROGRESS NOTES
" Postpartum Day 2    Patient Name:  María Elena Camacho  :  1995  MRN:  [id number]      Assessment:  Normal postpartum course.     Plan:  Continue current care.  Discharge home today per pt request.     Subjective:  The patient feels well:  Voiding without difficulty, lochia normal, tolerating normal diet, and passing flatus.  Pain is well controlled with current medications.  The patient has no emotional concerns.  The baby is well.    Objective:  /63 (BP Location: Right arm, Patient Position: Semi-Boles's, Cuff Size: Adult Regular)   Pulse 76   Temp 97.6  F (36.4  C) (Oral)   Resp 18   Ht 1.549 m (5' 1\")   Wt 89.8 kg (198 lb)   SpO2 95%   Breastfeeding Unknown   BMI 37.41 kg/m    Patient Vitals for the past 24 hrs:   BP Temp Temp src Pulse Resp SpO2   22 0822 114/63 97.6  F (36.4  C) Oral 76 -- 95 %   22 0036 112/60 98  F (36.7  C) Oral 74 18 99 %   22 2053 118/53 98  F (36.7  C) Oral 77 -- 99 %   22 1700 120/61 -- -- -- -- --   22 1600 -- -- -- -- -- 100 %   22 1500 -- -- -- -- -- 99 %   22 1330 103/63 98.1  F (36.7  C) Oral -- 18 99 %   22 1130 -- -- -- -- -- 98 %   22 1030 -- -- -- -- -- 98 %       The amount and color of the lochia is appropriate for the duration of recovery.  The uterine fundus is firm.  Urinary output is adequate.  The incision is healing well.  The patient is ambulating well.  The patient is tolerating a regular diet.    Lab Results   Component Value Date    AS Negative 2022    HGB 12.3 2022       Immunization History   Administered Date(s) Administered     COVID-19,PF,Pfizer (12+ Yrs) 2021, 2021, 2021     DTAP (<7y) 1993, 1993, 1994, 1995, 1998     FLU 6-35 months 10/16/2015, 10/27/2016     Flu, Unspecified 2018     HPV Quadrivalent 10/05/2007, 12/10/2007, 2008     Hep B, Peds or Adolescent 1993, 1993, 1994     HepA-ped " 2 Dose 03/04/2005, 10/05/2007     Hib, Unspecified 1993, 1993, 11/25/1994, 02/22/1995     Influenza (IIV3) PF 10/27/2016     Influenza Vaccine IM > 6 months Valent IIV4 (Alfuria,Fluzone) 09/15/2014, 09/20/2017, 11/10/2020, 12/29/2021     MMR 11/25/1994, 03/04/2005     Meningococcal (Menactra ) 10/05/2007     Poliovirus, inactivated (IPV) 1993, 1993, 02/22/1995, 08/24/1998     TDAP Vaccine (Adacel) 03/04/2005, 06/18/2010     Tdap (Adacel,Boostrix) 02/05/2019, 01/28/2020     Varicella 09/04/1996, 10/05/2007         Provider:  FAN Mulligan CNM on 8/23/2022 at 9:41 AM      Date:  8/23/2022  Time:  9:40 AM

## 2022-08-23 NOTE — DISCHARGE INSTRUCTIONS
"  Warning Signs after Having a Baby (Postpartum)  Keep this paper on your fridge or somewhere else where you can see it.  Baby's birth date ___________________ My provider/hospital ___________________________________  The symptoms below can happen to anyone after giving birth. They can be very serious. Call your provider if you have any of these warning signs.   Call 911 if you:  Are thinking about hurting yourself or your baby  Have any signs below in bold and with a star*  Bleeding  Losing too much blood is called a hemorrhage. These are signs of hemorrhage:  Soaking through a pad in less than an hour  Passing blood clots bigger than a golf ball  Mood problems (postpartum depression)  Many women feel sad after having a baby. But for others, mood swings are worse. Call your provider right away if you are:  Feeling so anxious or nervous that you can't care for yourself or your baby  Thinking about hurting yourself or your baby*  High blood pressure (pre-eclampsia)  Even if you didn't have high blood pressure when you were pregnant, you are still at risk for the high blood pressure disease called pre-eclampsia. Your risk can last up to 12 weeks after giving birth.  These are signs of pre-eclampsia:  Severe headache that does not get better after taking medicine*  Seeing spots, flashes of lights, blurry vision or other changes to eyesight*  Pain on your right side under your rib cage  Sudden swelling in the hands and face  Fainting, shaking or other signs of a seizure*  General feeling of \"not feeling right\"*  Infection  Redness around your incision (if you had surgery)  Really bad pain in your bottom if you had stitches  Any yellow, white or green fluid coming from places where you had stitches or surgery  Fever of 100.4 F (38 C) or higher  Blood clots  After you give birth, your body naturally clumps, or clots, its blood to help prevent blood loss (hemorrhage). Sometimes, this can lead to dangerous blood clots. Here " are signs that you may have a blood clot:  Pain in your chest*  Hard to breathe*  A red or swollen spot on your leg that is warm or painful when you touch it  Remember:You know your body. If something doesn't feel right, call your provider.  For informational purposes only. Not to replace the advice of your health care provider. Copyright   2020 [employer]. All rights reserved. Clinically reviewed by ARMAAN Luna-OB, MSN. [employer] [id number] - 1/20.  For informational purposes only. Not to replace the advice of your health care provider. Copyright   2020 [employer]. All rights reserved. Clinically reviewed by ARMAAN Luna-OB, MSN. [employer] [id number] - 1/20.

## 2022-08-23 NOTE — PLAN OF CARE
Problem: Plan of Care - These are the overarching goals to be used throughout the patient stay.    Goal: Plan of Care Review/Shift Note  Description: The Plan of Care Review/Shift note should be completed every shift.  The Outcome Evaluation is a brief statement about your assessment that the patient is improving, declining, or no change.  This information will be displayed automatically on your shift note.  8/23/2022 1226 by Aurora Dubois, RN  Outcome: Met  8/23/2022 1226 by Aurora Dubois, RN  Outcome: Met   Discharge instructions, warning signs, follow up appointment and safe use of home medication reviewed. Patient verbalized understanding of instruction.

## 2022-08-24 NOTE — ANESTHESIA POSTPROCEDURE EVALUATION
Patient: María Elena Camacho    Procedure: Procedure(s):   SECTION       Anesthesia Type:  Spinal    Note:  Disposition: Inpatient   Postop Pain Control: Uneventful            Sign Out: Well controlled pain   PONV: No   Neuro/Psych: Uneventful            Sign Out: Acceptable/Baseline neuro status   Airway/Respiratory: Uneventful            Sign Out: Acceptable/Baseline resp. status   CV/Hemodynamics: Uneventful            Sign Out: Acceptable CV status; No obvious hypovolemia; No obvious fluid overload   Other NRE: NONE   DID A NON-ROUTINE EVENT OCCUR? No           Last vitals:  Vitals:    22 2053 22 0036 22   BP: 118/53 112/60 114/63   Pulse: 77 74 76   Resp:  18    Temp: 36.7  C (98  F) 36.7  C (98  F) 36.4  C (97.6  F)   SpO2: 99% 99% 95%       Electronically Signed By: America Downs MD  2022  2:16 PM

## 2022-08-28 ENCOUNTER — HEALTH MAINTENANCE LETTER (OUTPATIENT)
Age: 27
End: 2022-08-28

## 2022-09-07 ENCOUNTER — TRANSFERRED RECORDS (OUTPATIENT)
Dept: HEALTH INFORMATION MANAGEMENT | Facility: CLINIC | Age: 27
End: 2022-09-07

## 2023-01-14 ENCOUNTER — HEALTH MAINTENANCE LETTER (OUTPATIENT)
Age: 28
End: 2023-01-14

## 2023-09-24 ENCOUNTER — HEALTH MAINTENANCE LETTER (OUTPATIENT)
Age: 28
End: 2023-09-24

## 2024-02-06 LAB
HEPATITIS B SURFACE ANTIGEN (EXTERNAL): NONREACTIVE
HEPATITIS C ANTIBODY (EXTERNAL): NONREACTIVE
HIV1+2 AB SERPL QL IA: NONREACTIVE
RUBELLA ANTIBODY IGG (EXTERNAL): NORMAL
TREPONEMA PALLIDUM ANTIBODY (EXTERNAL): NONREACTIVE

## 2024-08-16 LAB
HEMOGLOBIN (EXTERNAL): 11.8 G/DL (ref 11.7–15.5)
PLATELET COUNT (EXTERNAL): 233 10E3/UL (ref 140–400)

## 2024-10-02 ENCOUNTER — PREP FOR PROCEDURE (OUTPATIENT)
Dept: OBGYN | Facility: CLINIC | Age: 29
End: 2024-10-02

## 2024-10-02 ENCOUNTER — ANESTHESIA EVENT (OUTPATIENT)
Dept: OBGYN | Facility: HOSPITAL | Age: 29
End: 2024-10-02
Payer: COMMERCIAL

## 2024-10-02 ENCOUNTER — HOSPITAL ENCOUNTER (INPATIENT)
Facility: HOSPITAL | Age: 29
LOS: 2 days | Discharge: HOME | End: 2024-10-04
Attending: OBSTETRICS & GYNECOLOGY | Admitting: OBSTETRICS & GYNECOLOGY
Payer: COMMERCIAL

## 2024-10-02 ENCOUNTER — ANESTHESIA (OUTPATIENT)
Dept: OBGYN | Facility: HOSPITAL | Age: 29
End: 2024-10-02
Payer: COMMERCIAL

## 2024-10-02 DIAGNOSIS — Z98.891 S/P REPEAT LOW TRANSVERSE C-SECTION: Primary | ICD-10-CM

## 2024-10-02 LAB
ABO/RH(D): NORMAL
ANTIBODY SCREEN: NEGATIVE
HGB BLD-MCNC: 12.1 G/DL (ref 11.7–15.7)
SPECIMEN EXPIRATION DATE: NORMAL
T PALLIDUM AB SER QL: NONREACTIVE

## 2024-10-02 PROCEDURE — 88302 TISSUE EXAM BY PATHOLOGIST: CPT | Mod: TC | Performed by: OBSTETRICS & GYNECOLOGY

## 2024-10-02 PROCEDURE — 59510 CESAREAN DELIVERY: CPT | Performed by: NURSE ANESTHETIST, CERTIFIED REGISTERED

## 2024-10-02 PROCEDURE — 250N000011 HC RX IP 250 OP 636: Performed by: OBSTETRICS & GYNECOLOGY

## 2024-10-02 PROCEDURE — 360N000076 HC SURGERY LEVEL 3, PER MIN: Performed by: OBSTETRICS & GYNECOLOGY

## 2024-10-02 PROCEDURE — 250N000009 HC RX 250: Performed by: OBSTETRICS & GYNECOLOGY

## 2024-10-02 PROCEDURE — 710N000010 HC RECOVERY PHASE 1, LEVEL 2, PER MIN: Performed by: OBSTETRICS & GYNECOLOGY

## 2024-10-02 PROCEDURE — 272N000001 HC OR GENERAL SUPPLY STERILE: Performed by: OBSTETRICS & GYNECOLOGY

## 2024-10-02 PROCEDURE — 370N000017 HC ANESTHESIA TECHNICAL FEE, PER MIN: Performed by: OBSTETRICS & GYNECOLOGY

## 2024-10-02 PROCEDURE — 120N000013 HC R&B IMCU

## 2024-10-02 PROCEDURE — 36415 COLL VENOUS BLD VENIPUNCTURE: CPT | Performed by: OBSTETRICS & GYNECOLOGY

## 2024-10-02 PROCEDURE — 258N000003 HC RX IP 258 OP 636: Performed by: NURSE ANESTHETIST, CERTIFIED REGISTERED

## 2024-10-02 PROCEDURE — 250N000011 HC RX IP 250 OP 636: Performed by: NURSE ANESTHETIST, CERTIFIED REGISTERED

## 2024-10-02 PROCEDURE — 59510 CESAREAN DELIVERY: CPT | Performed by: ANESTHESIOLOGY

## 2024-10-02 PROCEDURE — 85018 HEMOGLOBIN: CPT | Performed by: OBSTETRICS & GYNECOLOGY

## 2024-10-02 PROCEDURE — 250N000013 HC RX MED GY IP 250 OP 250 PS 637: Performed by: OBSTETRICS & GYNECOLOGY

## 2024-10-02 PROCEDURE — 88302 TISSUE EXAM BY PATHOLOGIST: CPT | Mod: 26 | Performed by: PATHOLOGY

## 2024-10-02 PROCEDURE — 86780 TREPONEMA PALLIDUM: CPT | Performed by: OBSTETRICS & GYNECOLOGY

## 2024-10-02 PROCEDURE — 86901 BLOOD TYPING SEROLOGIC RH(D): CPT | Performed by: OBSTETRICS & GYNECOLOGY

## 2024-10-02 PROCEDURE — 999N000249 HC STATISTIC C-SECTION ON UNIT

## 2024-10-02 PROCEDURE — 0UT70ZZ RESECTION OF BILATERAL FALLOPIAN TUBES, OPEN APPROACH: ICD-10-PCS | Performed by: OBSTETRICS & GYNECOLOGY

## 2024-10-02 PROCEDURE — 258N000003 HC RX IP 258 OP 636: Performed by: ANESTHESIOLOGY

## 2024-10-02 PROCEDURE — C9290 INJ, BUPIVACAINE LIPOSOME: HCPCS | Performed by: ANESTHESIOLOGY

## 2024-10-02 PROCEDURE — 86900 BLOOD TYPING SEROLOGIC ABO: CPT | Performed by: OBSTETRICS & GYNECOLOGY

## 2024-10-02 PROCEDURE — 250N000011 HC RX IP 250 OP 636: Performed by: ANESTHESIOLOGY

## 2024-10-02 RX ORDER — AMOXICILLIN 250 MG
2 CAPSULE ORAL 2 TIMES DAILY
Status: DISCONTINUED | OUTPATIENT
Start: 2024-10-02 | End: 2024-10-04 | Stop reason: HOSPADM

## 2024-10-02 RX ORDER — BUPIVACAINE HYDROCHLORIDE 2.5 MG/ML
INJECTION, SOLUTION EPIDURAL; INFILTRATION; INTRACAUDAL
Status: DISCONTINUED | OUTPATIENT
Start: 2024-10-02 | End: 2024-10-02

## 2024-10-02 RX ORDER — LOPERAMIDE HYDROCHLORIDE 2 MG/1
4 CAPSULE ORAL
Status: CANCELLED | OUTPATIENT
Start: 2024-10-02

## 2024-10-02 RX ORDER — OXYCODONE HYDROCHLORIDE 5 MG/1
5 TABLET ORAL EVERY 4 HOURS PRN
Status: DISCONTINUED | OUTPATIENT
Start: 2024-10-02 | End: 2024-10-04 | Stop reason: HOSPADM

## 2024-10-02 RX ORDER — OXYTOCIN 10 [USP'U]/ML
10 INJECTION, SOLUTION INTRAMUSCULAR; INTRAVENOUS
Status: DISCONTINUED | OUTPATIENT
Start: 2024-10-02 | End: 2024-10-04 | Stop reason: HOSPADM

## 2024-10-02 RX ORDER — LOPERAMIDE HYDROCHLORIDE 2 MG/1
2 CAPSULE ORAL
Status: DISCONTINUED | OUTPATIENT
Start: 2024-10-02 | End: 2024-10-04 | Stop reason: HOSPADM

## 2024-10-02 RX ORDER — CITRIC ACID/SODIUM CITRATE 334-500MG
30 SOLUTION, ORAL ORAL
Status: CANCELLED | OUTPATIENT
Start: 2024-10-02

## 2024-10-02 RX ORDER — LIDOCAINE 40 MG/G
CREAM TOPICAL
Status: CANCELLED | OUTPATIENT
Start: 2024-10-02

## 2024-10-02 RX ORDER — KETOROLAC TROMETHAMINE 30 MG/ML
30 INJECTION, SOLUTION INTRAMUSCULAR; INTRAVENOUS EVERY 6 HOURS
Status: COMPLETED | OUTPATIENT
Start: 2024-10-02 | End: 2024-10-03

## 2024-10-02 RX ORDER — BUPIVACAINE HYDROCHLORIDE 7.5 MG/ML
INJECTION, SOLUTION INTRASPINAL
Status: COMPLETED | OUTPATIENT
Start: 2024-10-02 | End: 2024-10-02

## 2024-10-02 RX ORDER — BISACODYL 10 MG
10 SUPPOSITORY, RECTAL RECTAL DAILY PRN
Status: DISCONTINUED | OUTPATIENT
Start: 2024-10-04 | End: 2024-10-04 | Stop reason: HOSPADM

## 2024-10-02 RX ORDER — LIDOCAINE 40 MG/G
CREAM TOPICAL
Status: DISCONTINUED | OUTPATIENT
Start: 2024-10-02 | End: 2024-10-02 | Stop reason: HOSPADM

## 2024-10-02 RX ORDER — MORPHINE SULFATE 0.5 MG/ML
150 INJECTION, SOLUTION EPIDURAL; INTRATHECAL; INTRAVENOUS ONCE
Status: DISCONTINUED | OUTPATIENT
Start: 2024-10-02 | End: 2024-10-02 | Stop reason: HOSPADM

## 2024-10-02 RX ORDER — LOPERAMIDE HYDROCHLORIDE 2 MG/1
2 CAPSULE ORAL
Status: CANCELLED | OUTPATIENT
Start: 2024-10-02

## 2024-10-02 RX ORDER — CARBOPROST TROMETHAMINE 250 UG/ML
250 INJECTION, SOLUTION INTRAMUSCULAR
Status: DISCONTINUED | OUTPATIENT
Start: 2024-10-02 | End: 2024-10-04 | Stop reason: HOSPADM

## 2024-10-02 RX ORDER — NALOXONE HYDROCHLORIDE 0.4 MG/ML
0.4 INJECTION, SOLUTION INTRAMUSCULAR; INTRAVENOUS; SUBCUTANEOUS
Status: DISCONTINUED | OUTPATIENT
Start: 2024-10-02 | End: 2024-10-04 | Stop reason: HOSPADM

## 2024-10-02 RX ORDER — TRANEXAMIC ACID 10 MG/ML
1 INJECTION, SOLUTION INTRAVENOUS EVERY 30 MIN PRN
Status: DISCONTINUED | OUTPATIENT
Start: 2024-10-02 | End: 2024-10-04 | Stop reason: HOSPADM

## 2024-10-02 RX ORDER — OXYTOCIN/0.9 % SODIUM CHLORIDE 30/500 ML
340 PLASTIC BAG, INJECTION (ML) INTRAVENOUS CONTINUOUS PRN
Status: DISCONTINUED | OUTPATIENT
Start: 2024-10-02 | End: 2024-10-02 | Stop reason: HOSPADM

## 2024-10-02 RX ORDER — MISOPROSTOL 200 UG/1
400 TABLET ORAL
Status: DISCONTINUED | OUTPATIENT
Start: 2024-10-02 | End: 2024-10-02 | Stop reason: HOSPADM

## 2024-10-02 RX ORDER — DEXTROSE, SODIUM CHLORIDE, SODIUM LACTATE, POTASSIUM CHLORIDE, AND CALCIUM CHLORIDE 5; .6; .31; .03; .02 G/100ML; G/100ML; G/100ML; G/100ML; G/100ML
INJECTION, SOLUTION INTRAVENOUS CONTINUOUS
Status: DISCONTINUED | OUTPATIENT
Start: 2024-10-02 | End: 2024-10-04 | Stop reason: HOSPADM

## 2024-10-02 RX ORDER — MODIFIED LANOLIN
OINTMENT (GRAM) TOPICAL
Status: DISCONTINUED | OUTPATIENT
Start: 2024-10-02 | End: 2024-10-04 | Stop reason: HOSPADM

## 2024-10-02 RX ORDER — FENTANYL CITRATE-0.9 % NACL/PF 10 MCG/ML
100 PLASTIC BAG, INJECTION (ML) INTRAVENOUS EVERY 5 MIN PRN
Status: DISCONTINUED | OUTPATIENT
Start: 2024-10-02 | End: 2024-10-02 | Stop reason: HOSPADM

## 2024-10-02 RX ORDER — OXYTOCIN 10 [USP'U]/ML
10 INJECTION, SOLUTION INTRAMUSCULAR; INTRAVENOUS
Status: CANCELLED | OUTPATIENT
Start: 2024-10-02

## 2024-10-02 RX ORDER — AMOXICILLIN 250 MG
1 CAPSULE ORAL 2 TIMES DAILY
Status: DISCONTINUED | OUTPATIENT
Start: 2024-10-02 | End: 2024-10-04 | Stop reason: HOSPADM

## 2024-10-02 RX ORDER — ONDANSETRON 4 MG/1
4 TABLET, ORALLY DISINTEGRATING ORAL EVERY 6 HOURS PRN
Status: DISCONTINUED | OUTPATIENT
Start: 2024-10-02 | End: 2024-10-02 | Stop reason: HOSPADM

## 2024-10-02 RX ORDER — NALOXONE HYDROCHLORIDE 0.4 MG/ML
0.1 INJECTION, SOLUTION INTRAMUSCULAR; INTRAVENOUS; SUBCUTANEOUS
Status: DISCONTINUED | OUTPATIENT
Start: 2024-10-02 | End: 2024-10-02

## 2024-10-02 RX ORDER — ONDANSETRON 4 MG/1
4 TABLET, ORALLY DISINTEGRATING ORAL EVERY 6 HOURS PRN
Status: DISCONTINUED | OUTPATIENT
Start: 2024-10-02 | End: 2024-10-04 | Stop reason: HOSPADM

## 2024-10-02 RX ORDER — METHYLERGONOVINE MALEATE 0.2 MG/ML
200 INJECTION INTRAVENOUS
Status: DISCONTINUED | OUTPATIENT
Start: 2024-10-02 | End: 2024-10-02 | Stop reason: HOSPADM

## 2024-10-02 RX ORDER — MISOPROSTOL 200 UG/1
800 TABLET ORAL
Status: CANCELLED | OUTPATIENT
Start: 2024-10-02

## 2024-10-02 RX ORDER — FENTANYL CITRATE 50 UG/ML
25 INJECTION, SOLUTION INTRAMUSCULAR; INTRAVENOUS EVERY 5 MIN PRN
Status: DISCONTINUED | OUTPATIENT
Start: 2024-10-02 | End: 2024-10-02 | Stop reason: HOSPADM

## 2024-10-02 RX ORDER — CEFAZOLIN SODIUM/WATER 2 G/20 ML
2 SYRINGE (ML) INTRAVENOUS
Status: DISCONTINUED | OUTPATIENT
Start: 2024-10-02 | End: 2024-10-02 | Stop reason: HOSPADM

## 2024-10-02 RX ORDER — CARBOPROST TROMETHAMINE 250 UG/ML
250 INJECTION, SOLUTION INTRAMUSCULAR
Status: DISCONTINUED | OUTPATIENT
Start: 2024-10-02 | End: 2024-10-02 | Stop reason: HOSPADM

## 2024-10-02 RX ORDER — METOCLOPRAMIDE HYDROCHLORIDE 5 MG/ML
10 INJECTION INTRAMUSCULAR; INTRAVENOUS EVERY 6 HOURS PRN
Status: DISCONTINUED | OUTPATIENT
Start: 2024-10-02 | End: 2024-10-04 | Stop reason: HOSPADM

## 2024-10-02 RX ORDER — LOPERAMIDE HYDROCHLORIDE 2 MG/1
2 CAPSULE ORAL
Status: DISCONTINUED | OUTPATIENT
Start: 2024-10-02 | End: 2024-10-02 | Stop reason: HOSPADM

## 2024-10-02 RX ORDER — NALOXONE HYDROCHLORIDE 0.4 MG/ML
0.2 INJECTION, SOLUTION INTRAMUSCULAR; INTRAVENOUS; SUBCUTANEOUS
Status: DISCONTINUED | OUTPATIENT
Start: 2024-10-02 | End: 2024-10-04 | Stop reason: HOSPADM

## 2024-10-02 RX ORDER — SODIUM PHOSPHATE,MONO-DIBASIC 19G-7G/118
1 ENEMA (ML) RECTAL DAILY PRN
Status: DISCONTINUED | OUTPATIENT
Start: 2024-10-04 | End: 2024-10-04 | Stop reason: HOSPADM

## 2024-10-02 RX ORDER — SIMETHICONE 80 MG
80 TABLET,CHEWABLE ORAL 4 TIMES DAILY PRN
Status: DISCONTINUED | OUTPATIENT
Start: 2024-10-02 | End: 2024-10-04 | Stop reason: HOSPADM

## 2024-10-02 RX ORDER — CEFAZOLIN SODIUM 1 G/3ML
2 INJECTION, POWDER, FOR SOLUTION INTRAMUSCULAR; INTRAVENOUS SEE ADMIN INSTRUCTIONS
Status: CANCELLED | OUTPATIENT
Start: 2024-10-02

## 2024-10-02 RX ORDER — LOPERAMIDE HYDROCHLORIDE 2 MG/1
4 CAPSULE ORAL
Status: DISCONTINUED | OUTPATIENT
Start: 2024-10-02 | End: 2024-10-02 | Stop reason: HOSPADM

## 2024-10-02 RX ORDER — SODIUM CHLORIDE, SODIUM LACTATE, POTASSIUM CHLORIDE, CALCIUM CHLORIDE 600; 310; 30; 20 MG/100ML; MG/100ML; MG/100ML; MG/100ML
INJECTION, SOLUTION INTRAVENOUS CONTINUOUS
Status: DISCONTINUED | OUTPATIENT
Start: 2024-10-02 | End: 2024-10-02 | Stop reason: HOSPADM

## 2024-10-02 RX ORDER — OXYTOCIN 10 [USP'U]/ML
10 INJECTION, SOLUTION INTRAMUSCULAR; INTRAVENOUS
Status: DISCONTINUED | OUTPATIENT
Start: 2024-10-02 | End: 2024-10-02 | Stop reason: HOSPADM

## 2024-10-02 RX ORDER — PROCHLORPERAZINE MALEATE 10 MG
10 TABLET ORAL EVERY 6 HOURS PRN
Status: DISCONTINUED | OUTPATIENT
Start: 2024-10-02 | End: 2024-10-02 | Stop reason: HOSPADM

## 2024-10-02 RX ORDER — OXYCODONE HYDROCHLORIDE 5 MG/1
5 TABLET ORAL
Status: DISCONTINUED | OUTPATIENT
Start: 2024-10-02 | End: 2024-10-02

## 2024-10-02 RX ORDER — DEXAMETHASONE SODIUM PHOSPHATE 4 MG/ML
4 INJECTION, SOLUTION INTRA-ARTICULAR; INTRALESIONAL; INTRAMUSCULAR; INTRAVENOUS; SOFT TISSUE
Status: DISCONTINUED | OUTPATIENT
Start: 2024-10-02 | End: 2024-10-02

## 2024-10-02 RX ORDER — FENTANYL CITRATE 50 UG/ML
50 INJECTION, SOLUTION INTRAMUSCULAR; INTRAVENOUS EVERY 5 MIN PRN
Status: DISCONTINUED | OUTPATIENT
Start: 2024-10-02 | End: 2024-10-02 | Stop reason: HOSPADM

## 2024-10-02 RX ORDER — OXYTOCIN/0.9 % SODIUM CHLORIDE 30/500 ML
100-340 PLASTIC BAG, INJECTION (ML) INTRAVENOUS CONTINUOUS PRN
Status: DISCONTINUED | OUTPATIENT
Start: 2024-10-02 | End: 2024-10-04 | Stop reason: HOSPADM

## 2024-10-02 RX ORDER — KETOROLAC TROMETHAMINE 30 MG/ML
INJECTION, SOLUTION INTRAMUSCULAR; INTRAVENOUS PRN
Status: DISCONTINUED | OUTPATIENT
Start: 2024-10-02 | End: 2024-10-02

## 2024-10-02 RX ORDER — ONDANSETRON 4 MG/1
4 TABLET, ORALLY DISINTEGRATING ORAL EVERY 30 MIN PRN
Status: DISCONTINUED | OUTPATIENT
Start: 2024-10-02 | End: 2024-10-02 | Stop reason: HOSPADM

## 2024-10-02 RX ORDER — ONDANSETRON 2 MG/ML
4 INJECTION INTRAMUSCULAR; INTRAVENOUS EVERY 30 MIN PRN
Status: DISCONTINUED | OUTPATIENT
Start: 2024-10-02 | End: 2024-10-02 | Stop reason: HOSPADM

## 2024-10-02 RX ORDER — HYDROCORTISONE 25 MG/G
CREAM TOPICAL 3 TIMES DAILY PRN
Status: DISCONTINUED | OUTPATIENT
Start: 2024-10-02 | End: 2024-10-04 | Stop reason: HOSPADM

## 2024-10-02 RX ORDER — OXYCODONE HYDROCHLORIDE 5 MG/1
10 TABLET ORAL
Status: DISCONTINUED | OUTPATIENT
Start: 2024-10-02 | End: 2024-10-02

## 2024-10-02 RX ORDER — ACETAMINOPHEN 325 MG/1
975 TABLET ORAL ONCE
Status: DISCONTINUED | OUTPATIENT
Start: 2024-10-02 | End: 2024-10-02

## 2024-10-02 RX ORDER — NALBUPHINE HYDROCHLORIDE 20 MG/ML
2.5-5 INJECTION, SOLUTION INTRAMUSCULAR; INTRAVENOUS; SUBCUTANEOUS EVERY 6 HOURS PRN
Status: DISCONTINUED | OUTPATIENT
Start: 2024-10-02 | End: 2024-10-04 | Stop reason: HOSPADM

## 2024-10-02 RX ORDER — MISOPROSTOL 200 UG/1
800 TABLET ORAL
Status: DISCONTINUED | OUTPATIENT
Start: 2024-10-02 | End: 2024-10-04 | Stop reason: HOSPADM

## 2024-10-02 RX ORDER — ONDANSETRON 4 MG/1
4 TABLET, ORALLY DISINTEGRATING ORAL EVERY 30 MIN PRN
Status: DISCONTINUED | OUTPATIENT
Start: 2024-10-02 | End: 2024-10-02

## 2024-10-02 RX ORDER — METHYLERGONOVINE MALEATE 0.2 MG/ML
200 INJECTION INTRAVENOUS
Status: CANCELLED | OUTPATIENT
Start: 2024-10-02

## 2024-10-02 RX ORDER — FAMOTIDINE 10 MG
10 TABLET ORAL 2 TIMES DAILY
COMMUNITY

## 2024-10-02 RX ORDER — ONDANSETRON 2 MG/ML
4 INJECTION INTRAMUSCULAR; INTRAVENOUS EVERY 6 HOURS PRN
Status: DISCONTINUED | OUTPATIENT
Start: 2024-10-02 | End: 2024-10-04 | Stop reason: HOSPADM

## 2024-10-02 RX ORDER — LOPERAMIDE HYDROCHLORIDE 2 MG/1
4 CAPSULE ORAL
Status: DISCONTINUED | OUTPATIENT
Start: 2024-10-02 | End: 2024-10-04 | Stop reason: HOSPADM

## 2024-10-02 RX ORDER — FENTANYL CITRATE 50 UG/ML
INJECTION, SOLUTION INTRAMUSCULAR; INTRAVENOUS
Status: COMPLETED | OUTPATIENT
Start: 2024-10-02 | End: 2024-10-02

## 2024-10-02 RX ORDER — PROCHLORPERAZINE MALEATE 10 MG
10 TABLET ORAL EVERY 6 HOURS PRN
Status: DISCONTINUED | OUTPATIENT
Start: 2024-10-02 | End: 2024-10-04 | Stop reason: HOSPADM

## 2024-10-02 RX ORDER — OXYTOCIN/0.9 % SODIUM CHLORIDE 30/500 ML
340 PLASTIC BAG, INJECTION (ML) INTRAVENOUS CONTINUOUS PRN
Status: DISCONTINUED | OUTPATIENT
Start: 2024-10-02 | End: 2024-10-04 | Stop reason: HOSPADM

## 2024-10-02 RX ORDER — OXYTOCIN/0.9 % SODIUM CHLORIDE 30/500 ML
100-340 PLASTIC BAG, INJECTION (ML) INTRAVENOUS CONTINUOUS PRN
Status: CANCELLED | OUTPATIENT
Start: 2024-10-02

## 2024-10-02 RX ORDER — CEFAZOLIN SODIUM 1 G/3ML
2 INJECTION, POWDER, FOR SOLUTION INTRAMUSCULAR; INTRAVENOUS
Status: CANCELLED | OUTPATIENT
Start: 2024-10-02

## 2024-10-02 RX ORDER — MISOPROSTOL 200 UG/1
800 TABLET ORAL
Status: DISCONTINUED | OUTPATIENT
Start: 2024-10-02 | End: 2024-10-02 | Stop reason: HOSPADM

## 2024-10-02 RX ORDER — IBUPROFEN 800 MG/1
800 TABLET, FILM COATED ORAL EVERY 6 HOURS
Status: DISCONTINUED | OUTPATIENT
Start: 2024-10-03 | End: 2024-10-04 | Stop reason: HOSPADM

## 2024-10-02 RX ORDER — ONDANSETRON 2 MG/ML
4 INJECTION INTRAMUSCULAR; INTRAVENOUS EVERY 30 MIN PRN
Status: DISCONTINUED | OUTPATIENT
Start: 2024-10-02 | End: 2024-10-02

## 2024-10-02 RX ORDER — FENTANYL CITRATE 50 UG/ML
15 INJECTION, SOLUTION INTRAMUSCULAR; INTRAVENOUS ONCE
Status: DISCONTINUED | OUTPATIENT
Start: 2024-10-02 | End: 2024-10-02 | Stop reason: HOSPADM

## 2024-10-02 RX ORDER — METOCLOPRAMIDE 10 MG/1
10 TABLET ORAL EVERY 6 HOURS PRN
Status: DISCONTINUED | OUTPATIENT
Start: 2024-10-02 | End: 2024-10-04 | Stop reason: HOSPADM

## 2024-10-02 RX ORDER — SODIUM CHLORIDE, SODIUM LACTATE, POTASSIUM CHLORIDE, CALCIUM CHLORIDE 600; 310; 30; 20 MG/100ML; MG/100ML; MG/100ML; MG/100ML
INJECTION, SOLUTION INTRAVENOUS CONTINUOUS
Status: CANCELLED | OUTPATIENT
Start: 2024-10-02

## 2024-10-02 RX ORDER — METOCLOPRAMIDE 10 MG/1
10 TABLET ORAL EVERY 6 HOURS PRN
Status: DISCONTINUED | OUTPATIENT
Start: 2024-10-02 | End: 2024-10-02 | Stop reason: HOSPADM

## 2024-10-02 RX ORDER — MORPHINE SULFATE 1 MG/ML
INJECTION, SOLUTION EPIDURAL; INTRATHECAL; INTRAVENOUS
Status: COMPLETED | OUTPATIENT
Start: 2024-10-02 | End: 2024-10-02

## 2024-10-02 RX ORDER — MISOPROSTOL 200 UG/1
400 TABLET ORAL
Status: DISCONTINUED | OUTPATIENT
Start: 2024-10-02 | End: 2024-10-04 | Stop reason: HOSPADM

## 2024-10-02 RX ORDER — ONDANSETRON 2 MG/ML
4 INJECTION INTRAMUSCULAR; INTRAVENOUS EVERY 6 HOURS PRN
Status: DISCONTINUED | OUTPATIENT
Start: 2024-10-02 | End: 2024-10-02 | Stop reason: HOSPADM

## 2024-10-02 RX ORDER — ACETAMINOPHEN 325 MG/1
975 TABLET ORAL ONCE
Status: COMPLETED | OUTPATIENT
Start: 2024-10-02 | End: 2024-10-02

## 2024-10-02 RX ORDER — MISOPROSTOL 100 UG/1
400 TABLET ORAL
Status: CANCELLED | OUTPATIENT
Start: 2024-10-02

## 2024-10-02 RX ORDER — ONDANSETRON 2 MG/ML
INJECTION INTRAMUSCULAR; INTRAVENOUS PRN
Status: DISCONTINUED | OUTPATIENT
Start: 2024-10-02 | End: 2024-10-02

## 2024-10-02 RX ORDER — CEFAZOLIN SODIUM/WATER 2 G/20 ML
2 SYRINGE (ML) INTRAVENOUS SEE ADMIN INSTRUCTIONS
Status: DISCONTINUED | OUTPATIENT
Start: 2024-10-02 | End: 2024-10-02 | Stop reason: HOSPADM

## 2024-10-02 RX ORDER — METHYLERGONOVINE MALEATE 0.2 MG/ML
200 INJECTION INTRAVENOUS
Status: DISCONTINUED | OUTPATIENT
Start: 2024-10-02 | End: 2024-10-04 | Stop reason: HOSPADM

## 2024-10-02 RX ORDER — ACETAMINOPHEN 325 MG/1
975 TABLET ORAL EVERY 6 HOURS
Status: DISCONTINUED | OUTPATIENT
Start: 2024-10-02 | End: 2024-10-04 | Stop reason: HOSPADM

## 2024-10-02 RX ORDER — OXYTOCIN/0.9 % SODIUM CHLORIDE 30/500 ML
340 PLASTIC BAG, INJECTION (ML) INTRAVENOUS CONTINUOUS PRN
Status: CANCELLED | OUTPATIENT
Start: 2024-10-02

## 2024-10-02 RX ORDER — CETIRIZINE HYDROCHLORIDE 10 MG/1
10 TABLET ORAL DAILY
COMMUNITY

## 2024-10-02 RX ORDER — CITRIC ACID/SODIUM CITRATE 334-500MG
30 SOLUTION, ORAL ORAL
Status: COMPLETED | OUTPATIENT
Start: 2024-10-02 | End: 2024-10-02

## 2024-10-02 RX ORDER — METOCLOPRAMIDE HYDROCHLORIDE 5 MG/ML
10 INJECTION INTRAMUSCULAR; INTRAVENOUS EVERY 6 HOURS PRN
Status: DISCONTINUED | OUTPATIENT
Start: 2024-10-02 | End: 2024-10-02 | Stop reason: HOSPADM

## 2024-10-02 RX ORDER — ACETAMINOPHEN 325 MG/1
975 TABLET ORAL ONCE
Status: CANCELLED | OUTPATIENT
Start: 2024-10-02 | End: 2024-10-02

## 2024-10-02 RX ORDER — LIDOCAINE 40 MG/G
CREAM TOPICAL
Status: DISCONTINUED | OUTPATIENT
Start: 2024-10-02 | End: 2024-10-04 | Stop reason: HOSPADM

## 2024-10-02 RX ORDER — DEXAMETHASONE SODIUM PHOSPHATE 4 MG/ML
4 INJECTION, SOLUTION INTRA-ARTICULAR; INTRALESIONAL; INTRAMUSCULAR; INTRAVENOUS; SOFT TISSUE
Status: DISCONTINUED | OUTPATIENT
Start: 2024-10-02 | End: 2024-10-02 | Stop reason: HOSPADM

## 2024-10-02 RX ORDER — CARBOPROST TROMETHAMINE 250 UG/ML
250 INJECTION, SOLUTION INTRAMUSCULAR
Status: CANCELLED | OUTPATIENT
Start: 2024-10-02

## 2024-10-02 RX ORDER — TRANEXAMIC ACID 10 MG/ML
1 INJECTION, SOLUTION INTRAVENOUS EVERY 30 MIN PRN
Status: DISCONTINUED | OUTPATIENT
Start: 2024-10-02 | End: 2024-10-02 | Stop reason: HOSPADM

## 2024-10-02 RX ADMIN — KETOROLAC TROMETHAMINE 30 MG: 30 INJECTION, SOLUTION INTRAMUSCULAR at 19:22

## 2024-10-02 RX ADMIN — ONDANSETRON 4 MG: 2 INJECTION INTRAMUSCULAR; INTRAVENOUS at 11:16

## 2024-10-02 RX ADMIN — SODIUM CITRATE AND CITRIC ACID MONOHYDRATE 30 ML: 500; 334 SOLUTION ORAL at 11:09

## 2024-10-02 RX ADMIN — Medication 2 G: at 11:27

## 2024-10-02 RX ADMIN — SODIUM CHLORIDE, POTASSIUM CHLORIDE, SODIUM LACTATE AND CALCIUM CHLORIDE: 600; 310; 30; 20 INJECTION, SOLUTION INTRAVENOUS at 08:27

## 2024-10-02 RX ADMIN — PHENYLEPHRINE HYDROCHLORIDE 0.3 MCG/KG/MIN: 10 INJECTION INTRAVENOUS at 11:23

## 2024-10-02 RX ADMIN — ACETAMINOPHEN 975 MG: 325 TABLET ORAL at 14:35

## 2024-10-02 RX ADMIN — SENNOSIDES AND DOCUSATE SODIUM 1 TABLET: 8.6; 5 TABLET ORAL at 20:39

## 2024-10-02 RX ADMIN — BUPIVACAINE HYDROCHLORIDE IN DEXTROSE 1.6 ML: 7.5 INJECTION, SOLUTION SUBARACHNOID at 11:21

## 2024-10-02 RX ADMIN — Medication 100 ML/HR: at 13:00

## 2024-10-02 RX ADMIN — ONDANSETRON 4 MG: 2 INJECTION INTRAMUSCULAR; INTRAVENOUS at 18:13

## 2024-10-02 RX ADMIN — FENTANYL CITRATE 15 MCG: 50 INJECTION INTRAMUSCULAR; INTRAVENOUS at 11:21

## 2024-10-02 RX ADMIN — BUPIVACAINE HYDROCHLORIDE 20 ML: 2.5 INJECTION, SOLUTION EPIDURAL; INFILTRATION; INTRACAUDAL at 12:55

## 2024-10-02 RX ADMIN — PHENYLEPHRINE HYDROCHLORIDE 100 MCG: 10 INJECTION INTRAVENOUS at 11:23

## 2024-10-02 RX ADMIN — Medication 0.15 MG: at 11:21

## 2024-10-02 RX ADMIN — PHENYLEPHRINE HYDROCHLORIDE 100 MCG: 10 INJECTION INTRAVENOUS at 11:27

## 2024-10-02 RX ADMIN — BUPIVACAINE 20 ML: 13.3 INJECTION, SUSPENSION, LIPOSOMAL INFILTRATION at 12:55

## 2024-10-02 RX ADMIN — METOCLOPRAMIDE 10 MG: 10 TABLET ORAL at 15:27

## 2024-10-02 RX ADMIN — KETOROLAC TROMETHAMINE 30 MG: 30 INJECTION, SOLUTION INTRAMUSCULAR at 12:39

## 2024-10-02 RX ADMIN — ACETAMINOPHEN 975 MG: 325 TABLET ORAL at 20:39

## 2024-10-02 RX ADMIN — ACETAMINOPHEN 975 MG: 325 TABLET ORAL at 09:35

## 2024-10-02 RX ADMIN — TRANEXAMIC ACID 1 G: 10 INJECTION, SOLUTION INTRAVENOUS at 11:09

## 2024-10-02 RX ADMIN — Medication 300 ML/HR: at 11:50

## 2024-10-02 RX ADMIN — SODIUM CHLORIDE, POTASSIUM CHLORIDE, SODIUM LACTATE AND CALCIUM CHLORIDE: 600; 310; 30; 20 INJECTION, SOLUTION INTRAVENOUS at 13:05

## 2024-10-02 ASSESSMENT — ACTIVITIES OF DAILY LIVING (ADL)
ADLS_ACUITY_SCORE: 18
ADLS_ACUITY_SCORE: 35
ADLS_ACUITY_SCORE: 18
ADLS_ACUITY_SCORE: 22
ADLS_ACUITY_SCORE: 22

## 2024-10-02 ASSESSMENT — LIFESTYLE VARIABLES: TOBACCO_USE: 0

## 2024-10-02 NOTE — H&P
OB ADMISSION H&P - C SECTION     Date: 10/2/2024  NAME: Kayley Amaya   : 1995  MRN: [id number]     CC: scheduled c section      HPI: Kayley Amaya is a 29 year old  with  single inter-uterine gestation at 39+3wks, with Estimated Date of Delivery: 10-6-24 admitted today for repeat c section.  section secondary to history of three prior CSs. First CS in  for fetal intolerance at 8cm. Second was a scheduled repeat in  at 39+1wks. Third was a scheduled repeat in Aug 2022 but done early at 37+4wks when she came in in latent labor. Patient feels well. Has no complaints and reports good fetal movement. Patient denies headache, visual changes, RUQ pain. She denies contractions, leakage of fluid, or vaginal bleeding. Please see prenatal records.     Prenatal complications:   History of three prior Css  Hx depression and anxiety, currently well controlled  Hx of frequent UTIs, none this pregnancy  Varicella nonimmune  FH breast cancer in her mother at age 35 and ovarian cancer at age 36  Desires permanent sterilization. Sterilization consent form signed 24    OB HISTORY   OB History    Para Term  AB Living   4 3 3 0 0 3   SAB IAB Ectopic Multiple Live Births   0 0 0 0 3      # Outcome Date GA Lbr Ammon/2nd Weight Sex Type Anes PTL Lv   4 Current            3 Term 22 37w4d  3.374 kg (7 lb 7 oz) F CS-LTranv Spinal N AMINTA      Name: MONIQUEFEMALE-KAYLEY      Apgar1: 9  Apgar5: 9   2 Term 20 39w1d  3.45 kg (7 lb 9.7 oz) M CS-LTranv Spinal N AMINTA      Name: MONIQUEMALE-KAYLEY      Apgar1: 8  Apgar5: 9   1 Term 19 39w3d  3.48 kg (7 lb 10.8 oz) M  EPI N AMINTA      Name: FAHEEM AMAYA-KAYLEY      Apgar1: 8  Apgar5: 9       PAST MEDICAL HISTORY  History reviewed. No pertinent past medical history.     PAST SURGICAL HISTORY:   Past Surgical History:   Procedure Laterality Date     SECTION N/A 2019    Procedure:  SECTION;  Surgeon: Sandy  "Zulema FRAGOSO MD;  Location: [facility] OR;  Service: Obstetrics     SECTION N/A 2020    Procedure: REPEAT  SECTION;  Surgeon: Sylvie Leon MD;  Location: [facility] OR;  Service: Obstetrics     SECTION N/A 2022    Procedure:  SECTION;  Surgeon: Zulema Delgado MD;  Location: [facility]    NO HISTORY OF SURGERY          SOCIAL HISTORY   Reviewed, patient denies smoking and drug use  She is  . Father is  involved    MEDICATIONS  Current Facility-Administered Medications   Medication Dose Route Frequency Provider Last Rate Last Admin    BUPivacaine liposome (EXPAREL) 1.3 % LA inj susp 20 mL  20 mL Infiltration DURING SURGERY Lauren Nolan MD        BUPivacaine liposome (EXPAREL) LA inj was given in the infiltration site to produce post-op analgesia. Duration of action is up to 72 hours. Other \"yordan\" meds should not be given for 96 hours except for lidocaine 4% patch. This is for INFORMATION ONLY.   Does not apply Continuous PRN Lauren Nolan MD        carboprost (HEMABATE) injection 250 mcg  250 mcg Intramuscular Q15 Min PRN Zulema Delgado MD        And    loperamide (IMODIUM) capsule 4 mg  4 mg Oral Once PRN Zulema Delgado MD        ceFAZolin Sodium (ANCEF) injection 2 g  2 g Intravenous Pre-Op/Pre-procedure x 1 dose Zulema Delgado MD        ceFAZolin Sodium (ANCEF) injection 2 g  2 g Intravenous See Admin Instructions Zulema Delgado MD        fentaNYL (PF) (SUBLIMAZE) injection 15 mcg  15 mcg Intrathecal Once Lauren Nolan MD        lactated ringers BOLUS 250 mL  250 mL Intravenous Once PRN Lauren Nolan MD        lactated ringers BOLUS 500 mL  500 mL Intravenous Once Zulema Delgado MD        lactated ringers infusion   Intravenous Continuous Lauren Nolan MD        lactated ringers infusion   Intravenous Continuous Lauren Nolan MD " 100 mL/hr at 10/02/24 0827 New Bag at 10/02/24 0827    lactated ringers infusion   Intravenous Continuous Zulema Delgado MD        lidocaine (LMX4) cream   Topical Q1H PRN Lauren Nolan MD        lidocaine (LMX4) cream   Topical Q1H PRN Lauren Nolan MD        lidocaine (LMX4) cream   Topical Q1H PRN Zulema Delgado MD        lidocaine 1 % 0.1-1 mL  0.1-1 mL Other Q1H PRN Lauren Nolan MD        lidocaine 1 % 0.1-1 mL  0.1-1 mL Other Q1H PRN Lauren Nolan MD        lidocaine 1 % 0.1-1 mL  0.1-1 mL Other Q1H PRN Zulema Delgado MD        loperamide (IMODIUM) capsule 2 mg  2 mg Oral Q2H PRN Zulema Delgado MD        methylergonovine (METHERGINE) injection 200 mcg  200 mcg Intramuscular Q2H PRN Zulema Delgado MD        metoclopramide (REGLAN) tablet 10 mg  10 mg Oral Q6H PRN Lauren Nolan MD        Or    metoclopramide (REGLAN) injection 10 mg  10 mg Intravenous Q6H PRN Lauren Nolan MD        misoprostol (CYTOTEC) tablet 400 mcg  400 mcg Oral ONCE PRN REPEAT PER INSTRUCTIONS Zulema Delgado MD        Or    misoprostol (CYTOTEC) tablet 800 mcg  800 mcg Rectal ONCE PRN REPEAT PER INSTRUCTIONS Zulema Delgado MD        morphine (PF) (DURAMORPH) injection 150 mcg  150 mcg Intrathecal Once Lauren Nolan MD        nalbuphine (NUBAIN) injection 2.6-5 mg  2.6-5 mg Intravenous Q6H PRN Lauren Nolan MD        naloxone (NARCAN) injection 0.2 mg  0.2 mg Intravenous Q2 Min PRN Zulema Delgado MD        Or    naloxone (NARCAN) injection 0.4 mg  0.4 mg Intravenous Q2 Min PRN Zulema Delgado MD        Or    naloxone (NARCAN) injection 0.2 mg  0.2 mg Intramuscular Q2 Min PRN Zulema Delgado MD        Or    naloxone (NARCAN) injection 0.4 mg  0.4 mg Intramuscular Q2 Min PRN Zulema Delgado MD        ondansetron (ZOFRAN ODT) ODT tab 4 mg  4 mg Oral Q6H PRN  Lauren Nolan MD        Or    ondansetron (ZOFRAN) injection 4 mg  4 mg Intravenous Q6H PRN Lauren Nolan MD        oxytocin (PITOCIN) 30 units in 500 mL 0.9% NaCl infusion  340 mL/hr Intravenous Continuous PRN Zulema Delgado MD        oxytocin (PITOCIN) 30 units in 500 mL 0.9% NaCl infusion  100-340 mL/hr Intravenous Continuous PRN Zulema Delgado MD        oxytocin (PITOCIN) injection 10 Units  10 Units Intramuscular Once PRN Zulema Delgado MD        oxytocin (PITOCIN) injection 10 Units  10 Units Intramuscular Once PRN Zulema Delgado MD        phenylephrine (AUSTEN-SYNEPHRINE) injection 100 mcg  100 mcg Intravenous Q5 Min PRN Lauren Nolan MD        prochlorperazine (COMPAZINE) tablet 10 mg  10 mg Oral Q6H PRN Lauren Nolan MD        Or    prochlorperazine (COMPAZINE) injection 10 mg  10 mg Intravenous Q6H PRN Lauren Nolan MD        sodium chloride (PF) 0.9% PF flush 3 mL  3 mL Intracatheter Q8H Lauren Nolan MD        sodium chloride (PF) 0.9% PF flush 3 mL  3 mL Intracatheter q1 min prn Lauren Nolan MD        sodium chloride (PF) 0.9% PF flush 3 mL  3 mL Intracatheter Q8H Lauren Nolan MD        sodium chloride (PF) 0.9% PF flush 3 mL  3 mL Intracatheter q1 min prn Lauren Nolan MD        sodium chloride (PF) 0.9% PF flush 3 mL  3 mL Intracatheter Q8H Zulema Delgado MD        sodium chloride (PF) 0.9% PF flush 3 mL  3 mL Intracatheter q1 min prn Zulema Delgado MD        sodium citrate-citric acid (BICITRA) solution 30 mL  30 mL Oral Pre-Op/Pre-procedure x 1 dose Zulema Delgado MD        tranexamic acid 1 g in 100 mL NS IV bag (premix)  1 g Intravenous Q30 Min PRN Zulema Delgado MD           ALLERGIES  Allergies   Allergen Reactions    Sulfa Antibiotics Rash       ROS: otherwise negative except what is stated in HPI.     PHYSICAL EXAM   /58 (BP  "Location: Left arm, Patient Position: Semi-Boles's, Cuff Size: Adult Regular)   Pulse 72   Temp 98.2  F (36.8  C) (Oral)   Resp 18   Ht 1.549 m (5' 1\")   Wt 90.4 kg (199 lb 6.4 oz)   SpO2 99%   BMI 37.68 kg/m     Gen: no acute distress, resting comfortably   CV: acyanotic   Heart: regular rate and rhythm   Pulm: unlabored respirations, clear to ausculation bilaterally    Abd: gravid, soft, nontender   Extremities: soft, nontender   FHR: positive, category 1  [place]: no contractions      LABS  Hgb 12.1  GBS positive  O positive    IMPRESSION:   29 year old  at  39+3wks  single inter uterine pregnancy at term   Pregnancy complications include: hx three prior CS, depression/anxiety, desires permanent sterilization, FH breast and ovarian cancer in her mother   section secondary to scheduled fourth repeat with bilateral salpingectomy    PLAN:   - Admit to hospital  - Type and screen/hgb  - NPO   - Proceed with  section   - anesthesia notified       RISK - C SECTION  Patient counseled on risks, benefits, alternatives and expectations of  section.  Risks detailed to include, but not be limited to:  Pain, bleeding, infection, anesthesia complications, possible injury to bowel, bladder, baby and/or adjacent tissues, possible need for blood transfusion (with 1/50,000 risk of bloodborne pathogen [HIV and/or Hepatitis B/C] transmission) or even hysterectomy.  Patient voiced understanding of all R/B/A/E and has agreed to proceed with  section for delivery if needed or recommended.      Zulema Delgado MD          "

## 2024-10-02 NOTE — ANESTHESIA PROCEDURE NOTES
"Intrathecal injection Procedure Note    Pre-Procedure   Staff -        Anesthesiologist:  Lauren Nolan MD       Performed By: anesthesiologist       Location: OB       Procedure Start/Stop Times: 10/2/2024 11:21 AM and 10/2/2024 11:23 AM       Pre-Anesthestic Checklist: patient identified, IV checked, risks and benefits discussed, informed consent, monitors and equipment checked, pre-op evaluation, at physician/surgeon's request and post-op pain management  Timeout:       Correct Patient: Yes        Correct Procedure: Yes        Correct Site: Yes        Correct Position: Yes   Procedure Documentation  Procedure: intrathecal injection       Patient Position: sitting       Skin prep: Chloraprep       Insertion Site: L3-4. (midline approach).       Needle Gauge: 24.        Needle Length (Inches): 4        Spinal Needle Type: Pencan       Introducer used       # of attempts: 1 and  # of redirects:     Assessment/Narrative         Paresthesias: No.       Sensory Level: T4       CSF fluid: clear.    Medication(s) Administered   0.75% Hyperbaric Bupivacaine (Intrathecal) - Intrathecal   1.6 mL - 10/2/2024 11:21:00 AM  Morphine PF 1 mg/mL (Intrathecal) - Intrathecal   0.15 mg - 10/2/2024 11:21:00 AM  Fentanyl PF (Intrathecal) - Intrathecal   15 mcg - 10/2/2024 11:21:00 AM  Medication Administration Time: 10/2/2024 11:21 AM      FOR [facility] ([facility]) ONLY:   Pain Team Contact information: please page the Pain Team Via [employer]. Search \"Pain\". During daytime hours, please page the attending first. At night please page the resident first.      "

## 2024-10-02 NOTE — PROGRESS NOTES
Patient transferred to Post Partum in wheelchair, on room air, report given to Carolyn LARSEN RN.

## 2024-10-02 NOTE — ANESTHESIA PROCEDURE NOTES
TAP Procedure Note    Pre-Procedure   Staff -        Anesthesiologist:  Lauren Nolan MD       Performed By: anesthesiologist       Location: OR       Procedure Start/Stop Times: 10/2/2024 12:55 PM and 10/2/2024 12:58 PM       Pre-Anesthestic Checklist: patient identified, IV checked, risks and benefits discussed, informed consent, monitors and equipment checked, pre-op evaluation, at physician/surgeon's request and post-op pain management  Timeout:       Correct Patient: Yes        Correct Procedure: Yes        Correct Site: Yes        Correct Position: Yes        Correct Laterality: Yes        Site Marked: N/A  Procedure Documentation  Procedure: TAP       Laterality: bilateral       Patient Position: supine       Patient Prep/Sterile Barriers: sterile gloves, mask       Skin prep: Chloraprep       Needle Type: other (Stimuplex)       Needle Gauge: 20.        Needle Length (Inches): 4        Ultrasound guided       1. Ultrasound was used to identify targeted nerve, plexus, vascular marker, or fascial plane and place a needle adjacent to it in real-time.       2. Ultrasound was used to visualize the spread of anesthetic in close proximity to the above referenced structure.       3. A permanent image is entered into the patient's record.       4. The visualized anatomic structures appeared normal.       5. There were no apparent abnormal pathologic findings.    Assessment/Narrative         The placement was negative for: blood aspirated, painful injection and site bleeding       Paresthesias: No.       Bolus given via needle..        Secured via.        Insertion/Infusion Method: Single Shot       Complications: none       Injection made incrementally with aspirations every 5 mL.    Medication(s) Administered   Bupivacaine 0.25% PF (Infiltration) - Infiltration   20 mL - 10/2/2024 12:55:00 PM  Bupivacaine liposome (Exparel) 1.3% LA inj susp (Infiltration) - Infiltration   20 mL - 10/2/2024 12:55:00  "PM  Medication Administration Time: 10/2/2024 12:55 PM      FOR [facility] ONLY:   Pain Team Contact information: please page the Pain Team Via Pinpoint MD. Search \"Pain\". During daytime hours, please page the attending first. At night please page the resident first.      "

## 2024-10-02 NOTE — OP NOTE
Fourth Repeat  Section with Bilateral Salpingectomy Operative Note      Pre-operative Diagnosis: 1.  Intrauterine pregnancy 39 week 3 days gestation  2.  History of three prior CS  3.  Desires permanent sterilization  4.  Family history of premenopausal breast and ovarian cancer in her mother    Post-operative Diagnosis: same, delivered    Procedure:  Fourth repeat low segment transverse  section, postpartum bilateral salpingectomy    Surgeon:  Zulema Delgado M.D.    Assist:  Zainab Anders    Anesthesia:  spinal    Estimated Blood Loss:  qbl 289cc    Complications:  None; patient tolerated the procedure well.    Specimens: right and left Fallopian tubes    Indications for surgery:  María Elena is a 29yr old  at 39+3wks admitted for delivery by fourth repeat CS with bilateral salpingectomy. First baby born by primary CS in  for fetal intolerance at 8cm. Second was a scheduled repeat at 39+1wks in . Third baby was repeat CS in Aug 2022, done early at 37+4wks when she came in in latent labor in advance of her scheduled date. Delivery of this baby was planned by fourth repeat CS. She desires permanent sterilization, so will do bilateral salpingectomy also especially given FH of premenopausal breast and ovarian cancer in her mother.   The risks and benefits were discussed and consent obtained to proceed.    Findings:  7#11oz viable male delivered from ROT vertex presentation at 1149hrs. AF clear. Bulb suctioned, no nuchal cord. Vigorous, apgars 8,9.  Spontaneous delivery of intact placenta with 3VC. Tubes and ovaries normal bilaterally. Entire length of both tubes removed. Moderate amount thick dense scar tissue in abdominal wall layers. Peritoneum adherent to rectus. Bladder adherent to RAGHU.       [unfilled]  Procedure Details   The patient was taken to the Operating Room on [facility], identified as María Elena BANDA Lapadat and the procedure verified as  Delivery. A Time Out was held and the  above information confirmed.    After administration of spinal anesthesia, the patient was positioned in the left lateral tilt position and was prepped and draped in the usual sterile fashion. A Pfannenstiel incision was made through the previous scar line and carried down sharply through the subcutaneous tissue.  Firm scar in subcu layer. The fascia was incised horizontally, and the rectus abdominis muscles bluntly and sharply dissected away from the fascia superiorly and inferiorly to the pubic symphysis.  The rectus muscles were  in the midline, and the peritoneum was identified.  A vertical peritoneal incision was made and extended superiorly and inferiorly to the upper edge of the bladder with stretching. The vesicouterine peritoneal reflection was adherent to RAGHU; it was incised transversely and the bladder flap was bluntly dissected away from the lower uterine segment as able.    The lower uterine segment was very thin but intact. A horizontal incision was made in the lower uterine segment, and the incision was extended bilaterally in a curvilinear fashion with the bandage scissors.  Membranes were ruptured and the amnoitic fluid was  clear.  The infant was delivered from a ROT vertex presentation at 1149 hrs.  There was no nuchal cord.  Mouth and nares were bulb suctioned and cord doubly clamped and cut after one minute delay.  The infant was passed off to the Pediatric team in attendance with findings as noted above.      The placenta was delivered spontaneously, intact, with a 3 vessel cord.  The uterine cavity was wiped free of remaining clot and membrane.  The uterus was exteriorized and the cut edges of the uterine incision were grasped.  There were no extensions of the uterine incision.  The uterus was closed with a single layer of continuous running locked 0 vicryl. One figure of X was placed in the midline.  The uterine incision was inspected and all was hemostatic.  There was oozing where  the bladder was adherent to the uterus; one figure of X of 3.0 vicryl placed at the site and it was then hemostatic . The Fallopian tubes and ovaries were examined and appeared normal.  The bladder flap was inspected and was hemostatic.  The right Fallopian tube was grasped and elevated.  The mesosalpinx beneath the tube was clamped, cauterized and cut along the undersurface of the tube from the fimbriated end to within 1cm of the cornua. The tube was crossclamped at the cornua and removed. It was submitted to pathology for examination.  The surgical site of tube removal was hemostatic.  The same procedure was carried out on the left side, thus removing the entire length of the left tube.  It was also submitted to pathology.  The surgical sites of tube removal were hemostatic.  The uterus was replaced into the abdominal cavity.  The pericolic gutters were swabbed clean.  The uterine incision was once again inspected once back in its normal anatomic position and was hemostatic. Nato powder was applied.  The parietal peritoneum with adherent overlying rectus was closed with continuous running 0 vicryl.  The subfascial space was inspected and hemostasis achieved with electrocautery.  Oozing of capillaries from scar tissue took some additional time for hemostasis.  The fascia was closed with two pieces of continuous running 0 vicryl, starting at each corner and meeting in the midline.  The subcutaneous tissue was irrigated and hemostasis achieved with electrocautery.  The subcu was reapproximated with simple interrupted 3.0 plain.  Skin edges reapproximated with subcuticular 4.0 monocryl.  The incision was dressed with Dermabond and an island dressing. Anesthesia came in to place tap blocks.    Estimated blood loss was qbl 289cc.  Sponge and needle counts were correct.  There were no complications.  The patient tolerated the procedure well and was transferred to her recovery room in good condition.  The infant will be  under Start Nursery status.      Zulema Delgado M.D.

## 2024-10-02 NOTE — ANESTHESIA PREPROCEDURE EVALUATION
Anesthesia Pre-Procedure Evaluation    Patient: María Elena Camacho   MRN: [id number] : 1995        Procedure : Procedure(s):  REPEAT  SECTION WITH BILATERAL SALPINGECTOMY          No past medical history on file.   Past Surgical History:   Procedure Laterality Date     SECTION N/A 2019    Procedure:  SECTION;  Surgeon: Zulema Delgado MD;  Location: [facility];  Service: Obstetrics     SECTION N/A 2020    Procedure: REPEAT  SECTION;  Surgeon: Sylvie Leon MD;  Location: [facility] OR;  Service: Obstetrics     SECTION N/A 2022    Procedure:  SECTION;  Surgeon: Zulema Delgado MD;  Location: [facility]    NO HISTORY OF SURGERY        Allergies   Allergen Reactions    Sulfa Antibiotics Rash      Social History     Tobacco Use    Smoking status: Never    Smokeless tobacco: Never   Substance Use Topics    Alcohol use: Never     Comment: Social      Wt Readings from Last 1 Encounters:   22 89.8 kg (198 lb)        Anesthesia Evaluation   Pt has had prior anesthetic.     No history of anesthetic complications       ROS/MED HX  ENT/Pulmonary:    (-) tobacco use and asthma   Neurologic:  - neg neurologic ROS     Cardiovascular:    (-) hypertension and taking anticoagulants/antiplatelets   METS/Exercise Tolerance: >4 METS    Hematologic:       Musculoskeletal:       GI/Hepatic:     (+) GERD,                   Renal/Genitourinary:    (-) renal disease   Endo:     (+)               Obesity,    (-) Type II DM   Psychiatric/Substance Use:    (-) chronic opioid use history   Infectious Disease:    (-) Recent Fever   Malignancy:       Other:      (+) Possibly pregnant, , ,         Physical Exam    Airway        Mallampati: II   TM distance: > 3 FB   Neck ROM: full   Mouth opening: > 3 cm    Respiratory Devices and Support         Dental       (+) Completely normal teeth      Cardiovascular          Rhythm and  "rate: regular     Pulmonary   pulmonary exam normal            Other findings:  - 3 prior c/s    OUTSIDE LABS:  CBC:   Lab Results   Component Value Date    WBC 16.4 (H) 2022    WBC 12.1 (H) 2019    HGB 12.3 2022    HGB 13.8 2022    HCT 40.6 2022    HCT 36.1 2019     2022     2019     BMP:   Lab Results   Component Value Date    CR 0.55 (L) 2022    CR 0.66 2019    GLC 74 2019     COAGS:   Lab Results   Component Value Date    PTT 26 2022    INR 0.98 2022     POC: No results found for: \"BGM\", \"HCG\", \"HCGS\"  HEPATIC:   Lab Results   Component Value Date    ALT 12 2022    AST 16 2022     OTHER: No results found for: \"PH\", \"LACT\", \"A1C\", \"ROMA\", \"PHOS\", \"MAG\", \"LIPASE\", \"AMYLASE\", \"TSH\", \"T4\", \"T3\", \"CRP\", \"SED\"    Anesthesia Plan    ASA Status:  3    NPO Status:  ELEVATED Aspiration Risk/Unknown    Anesthesia Type: Spinal.         Techniques and Equipment:     - Lines/Monitors: 2nd IV     Consents    Anesthesia Plan(s) and associated risks, benefits, and realistic alternatives discussed. Questions answered and patient/representative(s) expressed understanding.     - Discussed:     - Discussed with:  Patient, Spouse      - Extended Intubation/Ventilatory Support Discussed: No.      - Patient is DNR/DNI Status: No     Use of blood products discussed: Yes.     - Discussed with: Patient.     - Consented: consented to blood products     Postoperative Care    Pain management: intrathecal morphine, Peripheral nerve block (Single Shot).        Comments:    Other Comments: SAB (morphine 150 mcg, fentanyl 15 mcg) + bilateral TAP blocks            Lauren Nolan MD    I have reviewed the pertinent notes and labs in the chart from the past 30 days and (re)examined the patient.  Any updates or changes from those notes are reflected in this note.                  "

## 2024-10-02 NOTE — ANESTHESIA CARE TRANSFER NOTE
Patient: María Elena BANDA Lapadashahida    Procedure: Procedure(s):  REPEAT  SECTION WITH BILATERAL SALPINGECTOMY       Diagnosis: Previous  delivery, delivered [O34.219]  Encounter for sterilization [Z30.2]  Diagnosis Additional Information: No value filed.    Anesthesia Type:   Spinal     Note:    Oropharynx: oropharynx clear of all foreign objects  Level of Consciousness: awake  Oxygen Supplementation: room air    Independent Airway: airway patency satisfactory and stable  Dentition: dentition unchanged  Vital Signs Stable: post-procedure vital signs reviewed and stable  Report to RN Given: handoff report given  Patient transferred to: Labor and Delivery    Handoff Report: Identifed the Patient, Identified the Reponsible Provider, Reviewed the pertinent medical history, Discussed the surgical course, Reviewed Intra-OP anesthesia mangement and issues during anesthesia, Set expectations for post-procedure period and Allowed opportunity for questions and acknowledgement of understanding      Vitals:  Vitals Value Taken Time   BP 95/51 10/02/24 1312   Temp 36.5  C (97.7  F) 10/02/24 1312   Pulse 68 10/02/24 1312   Resp 14 10/02/24 1312   SpO2 99 % 10/02/24 1312       Electronically Signed By: FAN Dyer CRNA  2024  1:13 PM

## 2024-10-02 NOTE — PLAN OF CARE
Patient states pain is well controlled, has Tap block in, toradol, and tylenol given.  VSS, Fundus is firm and at U, lochia is light.    Problem: Adult Inpatient Plan of Care  Goal: Absence of Hospital-Acquired Illness or Injury  Outcome: Progressing  Goal: Optimal Comfort and Wellbeing  Outcome: Progressing  Intervention: Monitor Pain and Promote Comfort  Recent Flowsheet Documentation  Taken 10/2/2024 1352 by Silvia Small RN  Pain Management Interventions: (can have tylenol soon.) declines  Intervention: Provide Person-Centered Care  Recent Flowsheet Documentation  Taken 10/2/2024 1320 by Silvia Small RN  Trust Relationship/Rapport:   care explained   choices provided   emotional support provided   empathic listening provided   questions answered  Goal: Readiness for Transition of Care  Outcome: Progressing  Intervention: Mutually Develop Transition Plan  Recent Flowsheet Documentation  Taken 10/2/2024 0818 by Silvia Small RN  Equipment Currently Used at Home: none     Problem: Postpartum ( Delivery)  Goal: Successful Parent Role Transition  Outcome: Progressing  Goal: Hemostasis  Outcome: Progressing  Goal: Effective Bowel Elimination  Outcome: Progressing  Goal: Fluid and Electrolyte Balance  Outcome: Progressing  Goal: Absence of Infection Signs and Symptoms  Outcome: Progressing  Goal: Anesthesia/Sedation Recovery  Outcome: Progressing  Goal: Optimal Pain Control and Function  Outcome: Progressing  Intervention: Prevent or Manage Pain  Recent Flowsheet Documentation  Taken 10/2/2024 1352 by Silvia Small RN  Pain Management Interventions: (can have tylenol soon.) declines  Taken 10/2/2024 1320 by Silvia Small RN  Perineal Care: perineal hygiene encouraged  Goal: Nausea and Vomiting Relief  Outcome: Progressing  Goal: Effective Urinary Elimination  Outcome: Progressing  Goal: Effective Oxygenation and Ventilation  Outcome: Progressing   Goal  Outcome Evaluation:

## 2024-10-02 NOTE — PROGRESS NOTES
Patient arrived around 730 am for scheduled C section.  FHM category 1, IV fluids started, pre op orders followed.  Patient denies pain or contractions.

## 2024-10-03 LAB
HGB BLD-MCNC: 10.7 G/DL (ref 11.7–15.7)
PATH REPORT.COMMENTS IMP SPEC: NORMAL
PATH REPORT.FINAL DX SPEC: NORMAL
PATH REPORT.FINAL DX SPEC: NORMAL
PATH REPORT.GROSS SPEC: NORMAL
PATH REPORT.GROSS SPEC: NORMAL
PATH REPORT.MICROSCOPIC SPEC OTHER STN: NORMAL
PATH REPORT.MICROSCOPIC SPEC OTHER STN: NORMAL
PATH REPORT.RELEVANT HX SPEC: NORMAL
PATH REPORT.RELEVANT HX SPEC: NORMAL
PHOTO IMAGE: NORMAL
PHOTO IMAGE: NORMAL

## 2024-10-03 PROCEDURE — 36415 COLL VENOUS BLD VENIPUNCTURE: CPT | Performed by: OBSTETRICS & GYNECOLOGY

## 2024-10-03 PROCEDURE — 250N000011 HC RX IP 250 OP 636: Performed by: OBSTETRICS & GYNECOLOGY

## 2024-10-03 PROCEDURE — 120N000013 HC R&B IMCU

## 2024-10-03 PROCEDURE — 250N000013 HC RX MED GY IP 250 OP 250 PS 637: Performed by: OBSTETRICS & GYNECOLOGY

## 2024-10-03 PROCEDURE — 85018 HEMOGLOBIN: CPT | Performed by: OBSTETRICS & GYNECOLOGY

## 2024-10-03 RX ADMIN — SENNOSIDES AND DOCUSATE SODIUM 1 TABLET: 8.6; 5 TABLET ORAL at 20:46

## 2024-10-03 RX ADMIN — SIMETHICONE 80 MG: 80 TABLET, CHEWABLE ORAL at 12:17

## 2024-10-03 RX ADMIN — SENNOSIDES AND DOCUSATE SODIUM 1 TABLET: 8.6; 5 TABLET ORAL at 09:15

## 2024-10-03 RX ADMIN — IBUPROFEN 800 MG: 800 TABLET ORAL at 13:48

## 2024-10-03 RX ADMIN — IBUPROFEN 800 MG: 800 TABLET ORAL at 20:03

## 2024-10-03 RX ADMIN — ACETAMINOPHEN 975 MG: 325 TABLET ORAL at 15:18

## 2024-10-03 RX ADMIN — KETOROLAC TROMETHAMINE 30 MG: 30 INJECTION, SOLUTION INTRAMUSCULAR at 01:39

## 2024-10-03 RX ADMIN — ACETAMINOPHEN 975 MG: 325 TABLET ORAL at 20:46

## 2024-10-03 RX ADMIN — SIMETHICONE 80 MG: 80 TABLET, CHEWABLE ORAL at 04:34

## 2024-10-03 RX ADMIN — ACETAMINOPHEN 975 MG: 325 TABLET ORAL at 02:43

## 2024-10-03 RX ADMIN — KETOROLAC TROMETHAMINE 30 MG: 30 INJECTION, SOLUTION INTRAMUSCULAR at 07:46

## 2024-10-03 RX ADMIN — ACETAMINOPHEN 975 MG: 325 TABLET ORAL at 09:15

## 2024-10-03 ASSESSMENT — ACTIVITIES OF DAILY LIVING (ADL)
ADLS_ACUITY_SCORE: 18
ADLS_ACUITY_SCORE: 22
ADLS_ACUITY_SCORE: 18

## 2024-10-03 NOTE — PLAN OF CARE
VS WDL, afebrile. Fundus, incision, dressing and bleeding WDL. Patient reports incision pain is well controlled with toradol and tylenol, as well as abdominal binder application. Euceda was removed at . Patient voided 100cc at , PVR was 51cc. Fluids encouraged, patient has had good PO intake. Eating well. Reports intermittent nausea that was relieved with zofran. Ambulating well in the room and bathroom. Denies dizziness and gait is steady. Patient is breastfeeding  with minimal to no assistance. Bonding well with .    Problem: Postpartum ( Delivery)  Goal: Successful Parent Role Transition  Outcome: Progressing     Problem: Postpartum ( Delivery)  Goal: Hemostasis  Outcome: Progressing     Problem: Postpartum ( Delivery)  Goal: Fluid and Electrolyte Balance  Outcome: Progressing     Problem: Postpartum ( Delivery)  Goal: Anesthesia/Sedation Recovery  Outcome: Progressing     Problem: Postpartum ( Delivery)  Goal: Optimal Pain Control and Function  Outcome: Progressing  Intervention: Prevent or Manage Pain  Recent Flowsheet Documentation  Taken 10/2/2024 2039 by Yadira Aquino, RN  Pain Management Interventions: medication (see MAR)  Taken 10/2/2024 2000 by Yadira Aquino, RN  Perineal Care:   absorbent brief/pad changed   perineum cleansed     Problem: Postpartum ( Delivery)  Goal: Nausea and Vomiting Relief  Outcome: Progressing  Intervention: Prevent or Manage Nausea and Vomiting  Recent Flowsheet Documentation  Taken 10/2/2024 1700 by Yadira Aquino, RN  Nausea/Vomiting Interventions:   cool cloth applied   nausea triggers minimized   sips of clear liquids given   slow deep breathing encouraged   stimuli minimized    Goal Outcome Evaluation:      Plan of Care Reviewed With: patient, spouse    Overall Patient Progress: improving

## 2024-10-03 NOTE — PLAN OF CARE
Problem: Postpartum ( Delivery)  Goal: Optimal Pain Control and Function  Outcome: Progressing     Problem: Postpartum ( Delivery)  Goal: Nausea and Vomiting Relief  Outcome: Progressing   Goal Outcome Evaluation:                    Nausea resolved, simethicone given for gas pain.    Minimal incision pain 1/0 - managed with tylenol/ibuprofen.     Dressing removed, clean/dry/no drainage   Breastfeeding baby

## 2024-10-03 NOTE — ANESTHESIA POSTPROCEDURE EVALUATION
Patient: María Elena BANDA Lapamahin    Procedure: Procedure(s):  REPEAT  SECTION WITH BILATERAL SALPINGECTOMY       Anesthesia Type:  Spinal    Note:     Postop Pain Control: Uneventful            Sign Out: Well controlled pain   PONV: No   Neuro/Psych: Uneventful            Sign Out: Acceptable/Baseline neuro status   Airway/Respiratory: Uneventful            Sign Out: Acceptable/Baseline resp. status   CV/Hemodynamics: Uneventful            Sign Out: Acceptable CV status; No obvious hypovolemia; No obvious fluid overload   Other NRE: NONE   DID A NON-ROUTINE EVENT OCCUR? No           Last vitals:  Vitals Value Taken Time   /55 10/02/24 1322   Temp 36.5  C (97.7  F) 10/02/24 1312   Pulse 68 10/02/24 1312   Resp 16 10/02/24 1327   SpO2 97 % 10/02/24 1327       Electronically Signed By: Migue Mejia MD  October 3, 2024  8:42 AM

## 2024-10-03 NOTE — PLAN OF CARE
Problem: Adult Inpatient Plan of Care  Goal: Plan of Care Review  Description: The Plan of Care Review/Shift note should be completed every shift.  The Outcome Evaluation is a brief statement about your assessment that the patient is improving, declining, or no change.  This information will be displayed automatically on your shift  note.  Outcome: Progressing  Flowsheets (Taken 10/3/2024 6580)  Plan of Care Reviewed With: patient  Overall Patient Progress: improving   Goal Outcome Evaluation:      Plan of Care Reviewed With: patient    Overall Patient Progress: improvingOverall Patient Progress: improving     VSS  Postpartum assessment WNL:  Fundus firm, -1U  Lochia scant  Primapore dressing CDI  Voiding  Minimal pain managed with scheduled toradol and tylenol

## 2024-10-03 NOTE — PROGRESS NOTES
" Postpartum Day 1    Patient Name:  María Elena Camacho  :  1995  MRN:  [id number]      Assessment:  Normal postpartum course.    Plan:  Continue current care.    Subjective:  The patient feels well:  Voiding without difficulty, lochia normal, tolerating normal diet, ambulating without difficulty and passing flatus.  Voiding independently without complication. Pain is well controlled with current medications.  The patient has no emotional concerns.  The baby is well and being fed by breast.    YOB: 2026   Birth Time: 11:49 AM     Prenatal complications:   History of three prior Css  Hx depression and anxiety, currently well controlled  Hx of frequent UTIs, none this pregnancy  Varicella nonimmune  FH breast cancer in her mother at age 37 and ovarian cancer at age 36  Desires permanent sterilization. Sterilization consent form signed 24  Objective:  /60 (BP Location: Right arm, Patient Position: Semi-Boles's, Cuff Size: Adult Regular)   Pulse 73   Temp 97.9  F (36.6  C) (Oral)   Resp 16   Ht 1.549 m (5' 1\")   Wt 90.4 kg (199 lb 6.4 oz)   SpO2 98%   Breastfeeding Unknown   BMI 37.68 kg/m    Patient Vitals for the past 24 hrs:   BP Temp Temp src Pulse Resp SpO2   10/03/24 1200 114/60 97.9  F (36.6  C) Oral 73 16 98 %   10/03/24 0739 121/55 98.5  F (36.9  C) Oral 67 16 98 %   10/03/24 0640 -- -- -- 66 16 97 %   10/03/24 0416 103/60 97.9  F (36.6  C) Oral 73 16 98 %   10/03/24 0330 -- -- -- 65 16 99 %   10/03/24 0156 -- -- -- 66 16 98 %   10/03/24 0024 114/66 97.9  F (36.6  C) Oral 65 16 99 %   10/02/24 2000 113/57 97.9  F (36.6  C) Oral 65 16 98 %   10/02/24 1859 113/60 97.7  F (36.5  C) Oral -- 16 97 %   10/02/24 1800 106/57 97.9  F (36.6  C) Oral 69 16 97 %   10/02/24 1700 116/65 97.8  F (36.6  C) Oral 68 16 98 %   10/02/24 1557 -- -- -- -- -- 99 %   10/02/24 1556 102/58 -- -- -- 14 --   10/02/24 1544 -- 97.9  F (36.6  C) Oral -- -- --   10/02/24 1532 -- -- -- -- -- 97 % "   10/02/24 1527 -- -- -- -- -- 98 %   10/02/24 1522 101/60 -- -- -- -- 98 %   10/02/24 1517 -- -- -- -- -- 97 %   10/02/24 1512 -- -- -- -- -- 99 %   10/02/24 1507 99/60 -- -- -- -- 98 %   10/02/24 1502 -- -- -- -- -- 99 %   10/02/24 1457 -- -- -- -- -- 97 %   10/02/24 1452 96/55 -- -- -- 16 98 %   10/02/24 1447 -- -- -- -- -- 99 %   10/02/24 1442 -- -- -- -- -- 97 %   10/02/24 1437 -- -- -- -- -- 97 %   10/02/24 1435 97/54 -- -- -- -- --   10/02/24 1432 -- -- -- -- -- 98 %   10/02/24 1427 -- -- -- -- -- 98 %   10/02/24 1422 105/57 -- -- -- -- 99 %   10/02/24 1417 -- -- -- -- -- 98 %   10/02/24 1412 -- -- -- -- -- 99 %   10/02/24 1407 104/55 -- -- -- 16 98 %   10/02/24 1402 -- -- -- -- -- 98 %   10/02/24 1352 103/59 -- -- -- 14 99 %   10/02/24 1337 103/57 -- -- -- -- 99 %   10/02/24 1327 -- -- -- -- 16 97 %   10/02/24 1322 103/55 -- -- -- -- 98 %   10/02/24 1317 -- -- -- -- -- 98 %   10/02/24 1312 95/51 97.7  F (36.5  C) Core 68 14 99 %   10/02/24 1308 95/51 -- -- -- -- --   10/02/24 1307 -- -- -- -- -- 98 %   10/02/24 1305 -- 98.3  F (36.8  C) Oral -- -- --       Exam: Patient A&O x 3. No acute distress, breathing unlabored. The amount and color of the lochia is appropriate for the duration of recovery. Uterine fundus is firm. Urinary output is adequate. The low transverse surgical dressing is clean, dry and intact.    Lab Results   Component Value Date    AS Negative 10/02/2024    HGB 10.7 (L) 10/03/2024       Immunization History   Administered Date(s) Administered    COVID-19 MONOVALENT 12+ (Pfizer) 03/29/2021, 04/19/2021, 12/29/2021    DTAP (<7y) 1995, 1995, 02/22/1994, 02/22/1995, 08/24/1998    Flu, Unspecified 11/27/2018    HEPATITIS A (PEDS 12M-18Y) 03/04/2005, 10/05/2007    HIB, Unspecified 1993, 1993, 11/25/1994, 02/22/1995    HPV Quadrivalent 10/05/2007, 12/10/2007, 05/20/2008    Hepatitis B, Peds 1995, 1995, 05/23/1994    Influenza (IIV3) PF 10/27/2016    Influenza  Vaccine >6 months,quad, PF 09/15/2014, 09/20/2017, 11/10/2020, 12/29/2021    Influenza, Split Virus, Trivalent, Pf (Fluzone\Fluarix) 10/16/2015, 10/27/2016, 09/11/2024    Influenza, seasonal, injectable, PF 10/16/2015, 10/27/2016    MMR 11/25/1994, 03/04/2005    Meningococcal ACWY (Menactra ) 10/05/2007    Poliovirus, inactivated (IPV) 1993, 1993, 02/22/1995, 08/24/1998    TDAP (Adacel,Boostrix) 02/05/2019, 01/28/2020, 07/15/2024    TDAP Vaccine (Adacel) 03/04/2005, 06/18/2010    Varicella 09/04/1996, 10/05/2007       Provider: Sergio Crum CNM    Date:  10/3/2024  Time:  12:08 PM

## 2024-10-03 NOTE — LACTATION NOTE
Initial Lactation Visit    Current age: 24 hours old    Gestational age at delivery: 39w3d     Diaper count: 6 wet 8 soiled transitional color     Feedings: 8 breast  1 supplement, human donor milk 2.5ml      Weight Loss: pending 24 hour cares    Breastfeeding goals: Will play it by ear if it works out with schedule at home    Past breastfeeding experience:  her second child for 6 months than her third for around 3 months.     Maternal health risk that may affect breastfeeding:None    Home Pump: Spectra    Feeding assessment: No feeding observed today, Mother declines assistance. Latch is comfortable, having difficulty latching to right side vs left but the last feeding was better on the right side. Infant had a circ and was sleepy, but Father attempting to bottle human donor milk to infant as parents explain that they are not sure how much infant is pulling at the breast. Educated on infant being sleepy from circ and exceeding wet/dirty diapers per age. Encouraged Mother if continued supplement to initiate pumping.     Feeding plan: Breastfeeding Plan:     Offer breast every 2-3 hours.   Massage breast to encourage milk flow   Strive for a deep and comfortable latch  Positioning reminders:  line up baby's nose to nipple   baby's chin touching the breast below the areola  ear, shoulder, hip, nice straight line   chin off chest  your thumb lined up like baby's mustache, fingers under breast like a baby's beard  cheeks touching breast  Switch sides when swallows slow, baby pauses lengthen and compressions do not help  If continued supplement, than initiate pumping for 15 minutes    Education:   [x] Expected  feeding patterns in the first few days (pg. 38 of Your Guide to To Postpartum and  Care)/ the Second Night  [x] Stages of milk production  [] Hand expression   [] Feeding cues     [] Benefits of skin to skin  [x] Breastfeeding positions  [] Tips to get and maintain a deep latch  [] Usage of  nipple shield  [] Nutritive vs.non-nutritive sucking  [x] Gentle breast compressions as needed  [] How to tell when baby is finished  [x] Supplementation  [] Paced bottle feeding  [] Spoon/cup feeding  [] LPI feeding patterns  [] LBW feeding patterns  [] Expected  output  []  weight loss  [] Infant Feeding Log  [] Calming techniques  [x] Engorgement/Reverse Pressure Softening  [] Pumping  [] Breastpump education  [x] Inpatient breastfeeding support  [] Outpatient lactation resources    Follow up: Mother will let bedside RN know if she would like assistance, declining at this time.

## 2024-10-03 NOTE — PLAN OF CARE
María Elena's vital and maternal assessment WDL. Incision is covered in a dry gauze/primapore dressing that is clean, dry and intake. Pain adequately managed with scheduled Tylenol and Toradol. Up independently, voiding without difficulty (voiding trails completed) and caring for self and infant. Breastfeeding, a deep and comfortable latch observed. María Elena and her supportive  Myron are attentive to infant needs/cues, asking appropriate questions and hold infant frequently. Positive attachment behaviors observed.      Problem: Adult Inpatient Plan of Care  Goal: Plan of Care Review  Description: The Plan of Care Review/Shift note should be completed every shift.  The Outcome Evaluation is a brief statement about your assessment that the patient is improving, declining, or no change.  This information will be displayed automatically on your shift  note.  Outcome: Progressing  Flowsheets (Taken 10/3/2024 0354)  Plan of Care Reviewed With:   patient   spouse  Overall Patient Progress: improving     Problem: Postpartum ( Delivery)  Goal: Optimal Pain Control and Function  Outcome: Progressing  Intervention: Prevent or Manage Pain  Recent Flowsheet Documentation  Taken 10/3/2024 0024 by Candis Fields RN  Perineal Care: perineal hygiene encouraged     Problem: Postpartum ( Delivery)  Goal: Effective Urinary Elimination  Outcome: Progressing  Intervention: Monitor and Manage Urinary Retention  Recent Flowsheet Documentation  Taken 10/3/2024 0024 by Candis Fields RN  Urinary Elimination Promotion: frequent voiding encouraged   Goal Outcome Evaluation:      Plan of Care Reviewed With: patient, spouse    Overall Patient Progress: improvingOverall Patient Progress: improving

## 2024-10-04 VITALS
TEMPERATURE: 98.1 F | RESPIRATION RATE: 18 BRPM | BODY MASS INDEX: 36.55 KG/M2 | HEIGHT: 61 IN | DIASTOLIC BLOOD PRESSURE: 69 MMHG | OXYGEN SATURATION: 98 % | WEIGHT: 193.6 LBS | HEART RATE: 74 BPM | SYSTOLIC BLOOD PRESSURE: 117 MMHG

## 2024-10-04 PROCEDURE — 250N000013 HC RX MED GY IP 250 OP 250 PS 637: Performed by: OBSTETRICS & GYNECOLOGY

## 2024-10-04 RX ORDER — OXYCODONE HYDROCHLORIDE 5 MG/1
5 TABLET ORAL EVERY 6 HOURS PRN
Qty: 12 TABLET | Refills: 0 | Status: SHIPPED | OUTPATIENT
Start: 2024-10-04

## 2024-10-04 RX ADMIN — IBUPROFEN 800 MG: 800 TABLET ORAL at 08:29

## 2024-10-04 RX ADMIN — ACETAMINOPHEN 975 MG: 325 TABLET ORAL at 02:43

## 2024-10-04 RX ADMIN — SENNOSIDES AND DOCUSATE SODIUM 1 TABLET: 8.6; 5 TABLET ORAL at 08:30

## 2024-10-04 RX ADMIN — ACETAMINOPHEN 975 MG: 325 TABLET ORAL at 08:29

## 2024-10-04 RX ADMIN — IBUPROFEN 800 MG: 800 TABLET ORAL at 02:08

## 2024-10-04 ASSESSMENT — ACTIVITIES OF DAILY LIVING (ADL)
ADLS_ACUITY_SCORE: 18

## 2024-10-04 NOTE — PLAN OF CARE
Problem: Adult Inpatient Plan of Care  Goal: Plan of Care Review  Description: The Plan of Care Review/Shift note should be completed every shift.  The Outcome Evaluation is a brief statement about your assessment that the patient is improving, declining, or no change.  This information will be displayed automatically on your shift  note.  Outcome: Met  Flowsheets (Taken 10/4/2024 3742)  Plan of Care Reviewed With: patient  Overall Patient Progress: improving   Goal Outcome Evaluation:      Plan of Care Reviewed With: patient    Overall Patient Progress: improvingOverall Patient Progress: improving       VSS  Postpartum assessment WNL:  Fundus firm, -2U  Lochia scant  Incision SUSAN/CDI  Minimal pain managed with scheduled tylenol and ibuprofen    Discharge instructions reviewed with pt and spouse who verbalize understanding of all cares, follow up and warning signs. All questions answered.

## 2024-10-04 NOTE — PLAN OF CARE
Goal Outcome Evaluation:      Plan of Care Reviewed With: patient    Overall Patient Progress: improvingOverall Patient Progress: improving    Outcome Evaluation: VSS. Postpartum assessment within normal limits.      Postpartum assessment is within normal limits. Pain managed with Ibuprofen and Tylenol. [place]  depression scale completed. Birth certificate in process, shaken baby video was watched. Patient desires to go home today. Attentive to infant and independent with cares.     Danelle Amaya RN on 10/4/2024 at 6:26 AM

## 2024-10-04 NOTE — DISCHARGE SUMMARY
OB Discharge Summary      Date:  10/4/2024    Name:  María Elena Camacho  :  1995  MRN:  [id number]      Admission Date:  10/2/2024  Delivery Date: 10/2/2024   Gestational Age at Delivery:  39w3d  Discharge Date:  10/4/2024    Principal Diagnosis:    Patient Active Problem List   Diagnosis     delivery delivered    S/P repeat low transverse          Conditions complicating Pregnancy:    History of three prior Css  Hx depression and anxiety, currently well controlled  Hx of frequent UTIs, none this pregnancy  Varicella nonimmune  FH breast cancer in her mother at age 37 and ovarian cancer at age 36  Desires permanent sterilization. Sterilization consent form signed 24    Procedure(s) Performed:  Spinal; fourth repeat low segment transverse  section, postpartum bilateral salpingectomy, TAP     Indication for :  4th repeat  Indication for Induction:  None     Condition at Discharge:  Good    Discharge Medications:      Review of your medicines        CONTINUE these medicines which may have CHANGED, or have new prescriptions. If we are uncertain of the size of tablets/capsules you have at home, strength may be listed as something that might have changed.        Dose / Directions   oxyCODONE 5 MG tablet  Commonly known as: ROXICODONE  This may have changed:   when to take this  reasons to take this  Used for: S/P repeat low transverse       Dose: 5 mg  Take 1 tablet (5 mg) by mouth every 6 hours as needed for breakthrough pain.  Quantity: 12 tablet  Refills: 0            CONTINUE these medicines which have NOT CHANGED        Dose / Directions   acetaminophen 325 MG tablet  Commonly known as: TYLENOL  Used for:  delivery delivered      Dose: 975 mg  Take 3 tablets (975 mg) by mouth every 6 hours as needed for mild pain  Refills: 0     calcium carbonate 500 MG chewable tablet  Commonly known as: TUMS      Dose: 2 chew tab  Take 2 chew tab by mouth 2 times  daily  Refills: 0     cetirizine 10 MG tablet  Commonly known as: zyrTEC      Dose: 10 mg  Take 10 mg by mouth daily.  Refills: 0     famotidine 10 MG tablet  Commonly known as: PEPCID      Dose: 10 mg  Take 10 mg by mouth 2 times daily.  Refills: 0     ibuprofen 800 MG tablet  Commonly known as: ADVIL/MOTRIN  Used for:  delivery delivered      Dose: 800 mg  Take 1 tablet (800 mg) by mouth every 6 hours as needed for other (cramping)  Refills: 0     PRENATAL PO      Refills: 0     senna-docusate 8.6-50 MG tablet  Commonly known as: SENOKOT-S/PERICOLACE  Used for:  delivery delivered      Dose: 1-2 tablet  Take 1-2 tablets by mouth 2 times daily as needed for constipation  Refills: 0               Where to get your medicines        Some of these will need a paper prescription and others can be bought over the counter. Ask your nurse if you have questions.    Bring a paper prescription for each of these medications  oxyCODONE 5 MG tablet          Discharge Plan:    Follow up with /CNM:  Dr. Delgado in 2 weeks for an incision check and [facility] in 6 weeks for a postpartum visit.   Patient Instructions:      Physical activity: No driving while on narctoics. No lifting anything greater than 15lbs for 6 weeks. Nothing in the vagina for 6 weeks.     Diet:  Regular. Drink 100 oz (3 liters) daily.     Medication: As listed above. May alternate ibuprofen and acetaminophen for pain management.      Physician/CNM: FAN MaganaM    Name:  María Elena Camacho  :  1995  MRN:  [id number]

## 2024-10-04 NOTE — DISCHARGE INSTRUCTIONS
Warning Signs after Having a Baby    Keep this paper on your fridge or somewhere else where you can see it.    Call your provider if you have any of these symptoms up to 12 weeks after having your baby.    Thoughts of hurting yourself or your baby  Pain in your chest or trouble breathing  Severe headache not helped by pain medicine  Eyesight concerns (blurry vision, seeing spots or flashes of light, other changes to eyesight)  Fainting, shaking or other signs of a seizure    Call 9-1-1 if you feel that it is an emergency.     The symptoms below can happen to anyone after giving birth. They can be very serious. Call your provider if you have any of these warning signs.    My provider s phone number: _______________________    Losing too much blood (hemorrhage)    Call your provider if you soak through a pad in less than an hour or pass blood clots bigger than a golf ball. These may be signs that you are bleeding too much.    Blood clots in the legs or lungs    After you give birth, your body naturally clots its blood to help prevent blood loss. Sometimes this increased clotting can happen in other areas of the body, like the legs or lungs. This can block your blood flow and be very dangerous.     Call your provider if you:  Have a red, swollen spot on the back of your leg that is warm or painful when you touch it.   Are coughing up blood.     Infection    Call your provider if you have any of these symptoms:  Fever of 100.4 F (38 C) or higher.  Pain or redness around your stitches if you had an incision.   Any yellow, white, or green fluid coming from places where you had stitches or surgery.    Mood Problems (postpartum depression)    Many people feel sad or have mood changes after having a baby. But for some people, these mood swings are worse.     Call your provider right away if you feel so anxious or nervous that you can't care for yourself or your baby.    Preeclampsia (high blood pressure)    Even if you  didn't have high blood pressure when you were pregnant, you are at risk for the high blood pressure disease called preeclampsia. This risk can last up to 12 weeks after giving birth.     Call your provider if you have:   Pain on your right side under your rib cage  Sudden swelling in the hands and face    Remember: You know your body. If something doesn't feel right, get medical help.     For informational purposes only. Not to replace the advice of your health care provider. Copyright 2020 [facility]. All rights reserved. Clinically reviewed by Che Kendrick, RNC-OB, MSN. INCIDE [id number] - Rev 02/23.

## 2024-10-04 NOTE — PLAN OF CARE
Goal Outcome Evaluation: Progressing       Plan of Care Reviewed With: patient, spouse    Overall Patient Progress: improvingOverall Patient Progress: improving    Outcome Evaluation: María Elena's VS remain stable.  She's taking scheduled Tylenol and Ibuprofen for pain she rates 1-2/10.  She's voiding and ambulating without difficulty.  Breastfeeding is going well.  She is able to facilitate a good latch for baby; many swallows heard.  Her nipples are intact and comfortable.   María Elena and her  are still undecided on a name for baby so birth certificate paper work has not yet been filled out.    Problem: Postpartum ( Delivery)  Goal: Successful Parent Role Transition  Intervention: Support Parent Role Transition  Recent Flowsheet Documentation  Taken 10/3/2024 1545 by Sofie Mcgraw RN  Supportive Measures:   active listening utilized   decision-making supported   positive reinforcement provided  Parent-Child Attachment Promotion:   caring behavior modeled   cue recognition promoted   face-to-face positioning promoted   participation in care promoted   interaction encouraged   positive reinforcement provided   skin-to-skin contact encouraged

## 2024-10-04 NOTE — PROGRESS NOTES
" Postpartum Day 2    Patient Name:  María Elena Camacho  :  1995  MRN:  [id number]      Assessment:  Normal postpartum course.    Plan:  Continue current care.  Discharge home today per patient preference.      Subjective:  The patient feels well:  Voiding without difficulty, lochia normal, tolerating normal diet, and passing flatus.  She denies headache, vision changes, URQ/epigastric pain, dizziness, lightheadedness, shortness of breath, and tachycardia. Pain is well controlled with current medications.  The patient has no emotional concerns.  The baby is well. She feels ready to go home today.    YOB: 2026     Birth Time: 11:49 AM     Prenatal Complications:    History of three prior Css  Hx depression and anxiety, currently well controlled  Hx of frequent UTIs, none this pregnancy  Varicella nonimmune  FH breast cancer in her mother at age 37 and ovarian cancer at age 36  Desires permanent sterilization. Sterilization consent form signed 24    Objective:  /69 (BP Location: Left arm)   Pulse 74   Temp 98.1  F (36.7  C) (Oral)   Resp 18   Ht 1.549 m (5' 1\")   Wt 90.4 kg (199 lb 6.4 oz)   SpO2 98%   Breastfeeding Unknown   BMI 37.68 kg/m    Patient Vitals for the past 24 hrs:   BP Temp Temp src Pulse Resp SpO2   10/04/24 0736 117/69 98.1  F (36.7  C) Oral 74 18 98 %   10/03/24 2339 127/59 97.8  F (36.6  C) Oral 66 18 99 %   10/03/24 1545 118/58 98.8  F (37.1  C) Oral -- 14 98 %   10/03/24 1200 114/60 97.9  F (36.6  C) Oral 73 16 98 %       Exam: Patient A&O x 3. No acute distress, breathing unlabored. The amount and color of the lochia is appropriate for the duration of recovery.  The patient is ambulating well without assistance.  The patient is tolerating a regular diet.    Lab Results   Component Value Date    AS Negative 10/02/2024    HGB 10.7 (L) 10/03/2024       Immunization History   Administered Date(s) Administered    COVID-19 MONOVALENT 12+ (Pfizer) 2021, " 04/19/2021, 12/29/2021    DTAP (<7y) 1995, 1995, 02/22/1994, 02/22/1995, 08/24/1998    Flu, Unspecified 11/27/2018    HEPATITIS A (PEDS 12M-18Y) 03/04/2005, 10/05/2007    HIB, Unspecified 1995, 1993, 11/25/1994, 02/22/1995    HPV Quadrivalent 10/05/2007, 12/10/2007, 05/20/2008    Hepatitis B, Peds 1995, 1995, 05/23/1994    Influenza (IIV3) PF 10/27/2016    Influenza Vaccine >6 months,quad, PF 09/15/2014, 09/20/2017, 11/10/2020, 12/29/2021    Influenza, Split Virus, Trivalent, Pf (Fluzone\Fluarix) 10/16/2015, 10/27/2016, 09/11/2024    Influenza, seasonal, injectable, PF 10/16/2015, 10/27/2016    MMR 11/25/1994, 03/04/2005    Meningococcal ACWY (Menactra ) 10/05/2007    Poliovirus, inactivated (IPV) 1993, 1993, 02/22/1995, 08/24/1998    TDAP (Adacel,Boostrix) 02/05/2019, 01/28/2020, 07/15/2024    TDAP Vaccine (Adacel) 03/04/2005, 06/18/2010    Varicella 09/04/1996, 10/05/2007         Provider:  FAN aMgana    Date:  10/4/2024  Time:  9:21 AM

## 2024-10-04 NOTE — LACTATION NOTE
This note was copied from a baby's chart.  Follow up Lactation Visit    Current age : 1 day old       Gestational age at delivery: 39 w3d     Diaper count: 5 wet 4 soiled      Feedings: 9 breast 2 supplement     Weight Loss: 5% at 0610 10/4/2024    Past breastfeeding experience: Yes    Maternal health risk that may affect breastfeeding:Anxiety, Depression    Home Pump: Yes    Feeding assessment: Declined- Mother reports that breat feeding is going well and that she has noted increased swallows at the breast    Feeding plan: Reviewed discharge plan with parents. Parents verbally acknowledged the plan.       Education:   [x] Expected  feeding patterns in the first few days (pg. 38 of Your Guide to To Postpartum and  Care)/ the Second Night  [] Stages of milk production  [] Hand expression   [x] Feeding cues     [] Benefits of skin to skin  [] Breastfeeding positions  [] Tips to get and maintain a deep latch  [] Usage of nipple shield  [] Nutritive vs.non-nutritive sucking  [] Gentle breast compressions as needed  [] How to tell when baby is finished  [] Supplementation  [] Paced bottle feeding  [] Spoon/cup feeding  [] LPI feeding patterns  [] LBW feeding patterns  [] Expected  output  [] [place] weight loss  [] Infant Feeding Log  [] Calming techniques  [x] Engorgement/Reverse Pressure Softening  [] Pumping  [x] Breastpump education  [] Inpatient breastfeeding support  [x] Outpatient lactation resources    Follow up: Will follow up: Follow up as needed for assistance with outpatient lactation

## 2024-10-04 NOTE — PROGRESS NOTES
D:  Patient desires discharge home.  Discharge orders received and entered by provider.  A:  Discharge instructions reviewed with the patient.  All questions and concerns addressed.  R:  Discharge criteria met.  4 Part ID bands double checked.   discharged in car seat with parents.  The nursing assistant escorted patient to car .

## 2024-10-06 ENCOUNTER — TRANSFERRED RECORDS (OUTPATIENT)
Dept: HEALTH INFORMATION MANAGEMENT | Facility: CLINIC | Age: 29
End: 2024-10-06
Payer: COMMERCIAL

## 2024-11-17 ENCOUNTER — HEALTH MAINTENANCE LETTER (OUTPATIENT)
Age: 29
End: 2024-11-17

## (undated) DEVICE — PREP CHLORAPREP 26ML TINTED HI-LITE ORANGE 930815

## (undated) DEVICE — UNDERPAD 30X30 BULK 949B10

## (undated) DEVICE — PACK MINOR SINGLE BASIN SSK3001

## (undated) DEVICE — SOL NACL 0.9% IRRIG 1000ML BOTTLE 2F7124

## (undated) DEVICE — SUTURE VICRYL+ 0 36IN CT-1 UND VCP946H

## (undated) DEVICE — ADH SKIN CLOSURE PREMIERPRO EXOFIN 1.0ML 3470

## (undated) DEVICE — CUSTOM PACK C-SECTION LHE

## (undated) DEVICE — DRSG PRIMAPORE 02X3" 7133

## (undated) DEVICE — PAD BLADDER CNTRL SM BL 4IN X 9.75IN  1100B

## (undated) DEVICE — GOWN IMPERVIOUS BREATHABLE SMART LG 89015

## (undated) DEVICE — PACK MAJOR BASIN 673

## (undated) DEVICE — SPONGE LAP 18X18" X8435

## (undated) DEVICE — RETR PANNICULUS TRAXI FOR PT POSITIONING PRS-1030

## (undated) DEVICE — BAG BIOHAZARD RED SMALL 24X23" A4823PR

## (undated) DEVICE — SUTURE MONOCRYL+ 4-0 PS-2 27IN MCP426H

## (undated) DEVICE — SU VICRYL 3-0 SH 27" UND J416H

## (undated) DEVICE — SU DERMABOND ADVANCED .7ML DNX12

## (undated) DEVICE — SUCTION MANIFOLD NEPTUNE 2 SYS 1 PORT 702-025-000

## (undated) DEVICE — SU PLAIN 0 TIE 54" S104H

## (undated) DEVICE — SURGICEL POWDER ABSORBABLE HEMOSTAT 3GM 3013SP

## (undated) DEVICE — ESU GROUND PAD ADULT REM W/15' CORD E7507DB

## (undated) DEVICE — GLOVE SURG PI ULTRA TOUCH M SZ 6-1/2 LF

## (undated) DEVICE — SUTURE VICRYL 0 BLUNT CTB-1 VCPB946H

## (undated) DEVICE — SU PLAIN 3-0 CT 27" 852H

## (undated) DEVICE — DRAPE IOBAN 2 C-SECTION 3M 6697

## (undated) DEVICE — ESU LIGASURE OPEN SEALER/DIVIDER SM JAW 16.5MM LF1212A

## (undated) DEVICE — SUTURE PLAIN 2-0 CTX 872H

## (undated) DEVICE — PLATE GROUNDING ADULT W/CORD 9165L

## (undated) DEVICE — GLOVE PI ULTRATCH M LF SZ 6.5 PF CUFF TEXT STRL LF 42665

## (undated) DEVICE — SOL WATER IRRIG 1000ML BOTTLE 2F7114

## (undated) RX ORDER — FENTANYL CITRATE-0.9 % NACL/PF 10 MCG/ML
PLASTIC BAG, INJECTION (ML) INTRAVENOUS
Status: DISPENSED
Start: 2022-08-21

## (undated) RX ORDER — FENTANYL CITRATE 50 UG/ML
INJECTION, SOLUTION INTRAMUSCULAR; INTRAVENOUS
Status: DISPENSED
Start: 2024-10-02

## (undated) RX ORDER — ONDANSETRON 2 MG/ML
INJECTION INTRAMUSCULAR; INTRAVENOUS
Status: DISPENSED
Start: 2022-08-21

## (undated) RX ORDER — DEXAMETHASONE SODIUM PHOSPHATE 10 MG/ML
INJECTION INTRAMUSCULAR; INTRAVENOUS
Status: DISPENSED
Start: 2022-08-21

## (undated) RX ORDER — MORPHINE SULFATE 1 MG/ML
INJECTION, SOLUTION EPIDURAL; INTRATHECAL; INTRAVENOUS
Status: DISPENSED
Start: 2022-08-21

## (undated) RX ORDER — MORPHINE SULFATE 1 MG/ML
INJECTION, SOLUTION EPIDURAL; INTRATHECAL; INTRAVENOUS
Status: DISPENSED
Start: 2024-10-02

## (undated) RX ORDER — KETOROLAC TROMETHAMINE 30 MG/ML
INJECTION, SOLUTION INTRAMUSCULAR; INTRAVENOUS
Status: DISPENSED
Start: 2022-08-21